# Patient Record
Sex: FEMALE | Race: WHITE | NOT HISPANIC OR LATINO | Employment: OTHER | URBAN - NONMETROPOLITAN AREA
[De-identification: names, ages, dates, MRNs, and addresses within clinical notes are randomized per-mention and may not be internally consistent; named-entity substitution may affect disease eponyms.]

---

## 2023-03-02 LAB — THYROTROPIN (MIU/L) IN SER/PLAS BY DETECTION LIMIT <= 0.05 MIU/L: 3.51 MIU/L (ref 0.44–3.98)

## 2023-04-03 PROBLEM — G62.9 NEUROPATHY: Status: ACTIVE | Noted: 2023-04-03

## 2023-04-15 DIAGNOSIS — E11.649 TYPE 2 DIABETES MELLITUS WITH HYPOGLYCEMIA WITHOUT COMA, WITHOUT LONG-TERM CURRENT USE OF INSULIN (MULTI): ICD-10-CM

## 2023-04-15 DIAGNOSIS — D52.9 ANEMIA DUE TO FOLIC ACID DEFICIENCY, UNSPECIFIED DEFICIENCY TYPE: ICD-10-CM

## 2023-04-15 DIAGNOSIS — E83.42 HYPOMAGNESEMIA: ICD-10-CM

## 2023-04-17 RX ORDER — FOLIC ACID 1 MG/1
1 TABLET ORAL DAILY
COMMUNITY
End: 2023-09-06 | Stop reason: SDUPTHER

## 2023-04-17 RX ORDER — FOLIC ACID 1 MG/1
TABLET ORAL
Qty: 90 TABLET | Refills: 0 | Status: SHIPPED | OUTPATIENT
Start: 2023-04-17 | End: 2023-07-17

## 2023-04-17 RX ORDER — LANOLIN ALCOHOL/MO/W.PET/CERES
CREAM (GRAM) TOPICAL
Qty: 90 TABLET | Refills: 0 | Status: SHIPPED | OUTPATIENT
Start: 2023-04-17 | End: 2023-07-24

## 2023-04-17 RX ORDER — LANOLIN ALCOHOL/MO/W.PET/CERES
1 CREAM (GRAM) TOPICAL DAILY
COMMUNITY
End: 2023-09-06 | Stop reason: SDUPTHER

## 2023-04-17 RX ORDER — METFORMIN HYDROCHLORIDE 500 MG/1
TABLET ORAL
Qty: 180 TABLET | Refills: 0 | Status: SHIPPED | OUTPATIENT
Start: 2023-04-17 | End: 2023-08-15 | Stop reason: SDUPTHER

## 2023-04-17 RX ORDER — METFORMIN HYDROCHLORIDE 500 MG/1
500 TABLET ORAL
COMMUNITY
End: 2023-09-06 | Stop reason: SDUPTHER

## 2023-04-18 PROBLEM — E11.649 TYPE 2 DIABETES MELLITUS WITH HYPOGLYCEMIA WITHOUT COMA, WITHOUT LONG-TERM CURRENT USE OF INSULIN (MULTI): Status: ACTIVE | Noted: 2023-04-18

## 2023-04-18 RX ORDER — BLOOD-GLUCOSE METER
1 EACH MISCELLANEOUS DAILY
Qty: 90 STRIP | Refills: 0 | Status: SHIPPED | OUTPATIENT
Start: 2023-04-18 | End: 2023-07-24 | Stop reason: SDUPTHER

## 2023-04-18 RX ORDER — BLOOD-GLUCOSE METER
1 EACH MISCELLANEOUS DAILY
COMMUNITY
End: 2023-04-18 | Stop reason: SDUPTHER

## 2023-05-22 PROBLEM — D64.9 ANEMIA: Status: ACTIVE | Noted: 2023-05-22

## 2023-05-31 DIAGNOSIS — G62.9 NEUROPATHY: ICD-10-CM

## 2023-05-31 RX ORDER — GABAPENTIN 100 MG/1
CAPSULE ORAL
Qty: 90 CAPSULE | Refills: 0 | Status: SHIPPED | OUTPATIENT
Start: 2023-05-31 | End: 2023-09-27

## 2023-07-16 DIAGNOSIS — D52.9 ANEMIA DUE TO FOLIC ACID DEFICIENCY, UNSPECIFIED DEFICIENCY TYPE: ICD-10-CM

## 2023-07-17 RX ORDER — FOLIC ACID 1 MG/1
TABLET ORAL
Qty: 90 TABLET | Refills: 0 | Status: SHIPPED | OUTPATIENT
Start: 2023-07-17 | End: 2023-10-30

## 2023-07-24 DIAGNOSIS — E83.42 HYPOMAGNESEMIA: ICD-10-CM

## 2023-07-24 DIAGNOSIS — E11.649 TYPE 2 DIABETES MELLITUS WITH HYPOGLYCEMIA WITHOUT COMA, WITHOUT LONG-TERM CURRENT USE OF INSULIN (MULTI): ICD-10-CM

## 2023-07-24 RX ORDER — LANOLIN ALCOHOL/MO/W.PET/CERES
CREAM (GRAM) TOPICAL
Qty: 90 TABLET | Refills: 0 | Status: SHIPPED | OUTPATIENT
Start: 2023-07-24 | End: 2023-11-06

## 2023-07-24 RX ORDER — BLOOD-GLUCOSE METER
1 EACH MISCELLANEOUS DAILY
Qty: 90 STRIP | Refills: 0 | Status: SHIPPED | OUTPATIENT
Start: 2023-07-24

## 2023-07-26 LAB
ALBUMIN (G/DL) IN SER/PLAS: 4.6 G/DL (ref 3.4–5)
ALBUMIN (MG/L) IN URINE: 13.8 MG/L
ALBUMIN/CREATININE (UG/MG) IN URINE: 11 UG/MG CRT (ref 0–30)
ANION GAP IN SER/PLAS: 12 MMOL/L (ref 10–20)
CALCIDIOL (25 OH VITAMIN D3) (NG/ML) IN SER/PLAS: 27 NG/ML
CALCIUM (MG/DL) IN SER/PLAS: 9.9 MG/DL (ref 8.6–10.3)
CARBON DIOXIDE, TOTAL (MMOL/L) IN SER/PLAS: 24 MMOL/L (ref 21–32)
CHLORIDE (MMOL/L) IN SER/PLAS: 105 MMOL/L (ref 98–107)
CREATININE (MG/DL) IN SER/PLAS: 1.25 MG/DL (ref 0.5–1.05)
CREATININE (MG/DL) IN URINE: 126 MG/DL (ref 20–320)
GFR FEMALE: 46 ML/MIN/1.73M2
GLUCOSE (MG/DL) IN SER/PLAS: 140 MG/DL (ref 74–99)
PARATHYRIN INTACT (PG/ML) IN SER/PLAS: 42.6 PG/ML (ref 18.5–88)
PHOSPHATE (MG/DL) IN SER/PLAS: 3.5 MG/DL (ref 2.5–4.9)
POTASSIUM (MMOL/L) IN SER/PLAS: 4.1 MMOL/L (ref 3.5–5.3)
SODIUM (MMOL/L) IN SER/PLAS: 137 MMOL/L (ref 136–145)
URATE (MG/DL) IN SER/PLAS: 4.4 MG/DL (ref 2.3–6.7)
UREA NITROGEN (MG/DL) IN SER/PLAS: 17 MG/DL (ref 6–23)

## 2023-08-11 DIAGNOSIS — E11.649 TYPE 2 DIABETES MELLITUS WITH HYPOGLYCEMIA WITHOUT COMA, WITHOUT LONG-TERM CURRENT USE OF INSULIN (MULTI): ICD-10-CM

## 2023-08-15 DIAGNOSIS — E11.649 TYPE 2 DIABETES MELLITUS WITH HYPOGLYCEMIA WITHOUT COMA, WITHOUT LONG-TERM CURRENT USE OF INSULIN (MULTI): ICD-10-CM

## 2023-08-15 RX ORDER — METFORMIN HYDROCHLORIDE 500 MG/1
TABLET ORAL
Qty: 180 TABLET | Refills: 0 | Status: SHIPPED | OUTPATIENT
Start: 2023-08-15 | End: 2023-09-06 | Stop reason: SDUPTHER

## 2023-08-16 RX ORDER — METFORMIN HYDROCHLORIDE 500 MG/1
TABLET ORAL
Qty: 180 TABLET | Refills: 0 | Status: SHIPPED | OUTPATIENT
Start: 2023-08-16 | End: 2024-03-25 | Stop reason: SDUPTHER

## 2023-09-05 ENCOUNTER — APPOINTMENT (OUTPATIENT)
Dept: PRIMARY CARE | Facility: CLINIC | Age: 73
End: 2023-09-05
Payer: MEDICARE

## 2023-09-06 DIAGNOSIS — D64.9 ANEMIA, UNSPECIFIED TYPE: ICD-10-CM

## 2023-09-06 PROBLEM — N18.32 STAGE 3B CHRONIC KIDNEY DISEASE (MULTI): Status: ACTIVE | Noted: 2023-09-06

## 2023-09-06 PROBLEM — E05.90 HYPERTHYROIDISM: Status: ACTIVE | Noted: 2023-09-06

## 2023-09-06 PROBLEM — F41.9 CHRONIC ANXIETY: Status: ACTIVE | Noted: 2023-09-06

## 2023-09-06 PROBLEM — K21.9 ESOPHAGEAL REFLUX: Status: ACTIVE | Noted: 2023-09-06

## 2023-09-06 PROBLEM — D50.9 IRON DEFICIENCY ANEMIA: Status: ACTIVE | Noted: 2023-09-06

## 2023-09-06 PROBLEM — R92.8 ABNORMAL MAMMOGRAM: Status: ACTIVE | Noted: 2023-09-06

## 2023-09-06 PROBLEM — D52.9 FOLATE-DEFICIENCY ANEMIA: Status: ACTIVE | Noted: 2023-09-06

## 2023-09-06 PROBLEM — E83.42 HYPOMAGNESEMIA: Status: ACTIVE | Noted: 2023-09-06

## 2023-09-06 PROBLEM — E04.2 NONTOXIC MULTINODULAR GOITER: Status: ACTIVE | Noted: 2023-09-06

## 2023-09-06 PROBLEM — E55.9 VITAMIN D DEFICIENCY: Status: ACTIVE | Noted: 2023-09-06

## 2023-09-06 PROBLEM — E78.5 HYPERLIPIDEMIA: Status: ACTIVE | Noted: 2023-09-06

## 2023-09-06 PROBLEM — I10 HTN (HYPERTENSION): Status: ACTIVE | Noted: 2023-09-06

## 2023-09-06 RX ORDER — CHOLECALCIFEROL (VITAMIN D3) 50 MCG
50 TABLET ORAL DAILY
COMMUNITY

## 2023-09-06 RX ORDER — ACETAMINOPHEN 325 MG/1
TABLET ORAL EVERY 6 HOURS
COMMUNITY
Start: 2021-12-30

## 2023-09-06 RX ORDER — FERROUS SULFATE 325(65) MG
1 TABLET, DELAYED RELEASE (ENTERIC COATED) ORAL DAILY
Qty: 90 TABLET | Refills: 1 | Status: SHIPPED | OUTPATIENT
Start: 2023-09-06 | End: 2023-09-14 | Stop reason: SDUPTHER

## 2023-09-06 RX ORDER — LOSARTAN POTASSIUM 25 MG/1
25 TABLET ORAL DAILY
COMMUNITY
Start: 2023-08-22 | End: 2024-04-17

## 2023-09-06 RX ORDER — FEBUXOSTAT 40 MG/1
40 TABLET, FILM COATED ORAL DAILY
COMMUNITY
Start: 2023-08-08 | End: 2024-05-14

## 2023-09-06 RX ORDER — DAPAGLIFLOZIN 5 MG/1
1 TABLET, FILM COATED ORAL
COMMUNITY
Start: 2022-01-13

## 2023-09-07 ENCOUNTER — APPOINTMENT (OUTPATIENT)
Dept: PRIMARY CARE | Facility: CLINIC | Age: 73
End: 2023-09-07
Payer: MEDICARE

## 2023-09-14 ENCOUNTER — LAB (OUTPATIENT)
Dept: LAB | Facility: LAB | Age: 73
End: 2023-09-14
Payer: MEDICARE

## 2023-09-14 ENCOUNTER — OFFICE VISIT (OUTPATIENT)
Dept: PRIMARY CARE | Facility: CLINIC | Age: 73
End: 2023-09-14
Payer: MEDICARE

## 2023-09-14 VITALS
BODY MASS INDEX: 31 KG/M2 | WEIGHT: 181.6 LBS | HEART RATE: 98 BPM | DIASTOLIC BLOOD PRESSURE: 68 MMHG | OXYGEN SATURATION: 99 % | TEMPERATURE: 96.8 F | HEIGHT: 64 IN | SYSTOLIC BLOOD PRESSURE: 110 MMHG

## 2023-09-14 DIAGNOSIS — R53.83 OTHER FATIGUE: ICD-10-CM

## 2023-09-14 DIAGNOSIS — Z23 FLU VACCINE NEED: ICD-10-CM

## 2023-09-14 DIAGNOSIS — N18.32 STAGE 3B CHRONIC KIDNEY DISEASE (MULTI): ICD-10-CM

## 2023-09-14 DIAGNOSIS — Z11.59 NEED FOR HEPATITIS C SCREENING TEST: ICD-10-CM

## 2023-09-14 DIAGNOSIS — E55.9 VITAMIN D DEFICIENCY, UNSPECIFIED: ICD-10-CM

## 2023-09-14 DIAGNOSIS — E11.649 TYPE 2 DIABETES MELLITUS WITH HYPOGLYCEMIA WITHOUT COMA, WITHOUT LONG-TERM CURRENT USE OF INSULIN (MULTI): ICD-10-CM

## 2023-09-14 DIAGNOSIS — E83.42 HYPOMAGNESEMIA: ICD-10-CM

## 2023-09-14 DIAGNOSIS — Z13.89 SCREENING FOR MULTIPLE CONDITIONS: ICD-10-CM

## 2023-09-14 DIAGNOSIS — E78.00 HYPERCHOLESTEREMIA: ICD-10-CM

## 2023-09-14 DIAGNOSIS — E78.5 HYPERLIPIDEMIA, UNSPECIFIED HYPERLIPIDEMIA TYPE: ICD-10-CM

## 2023-09-14 DIAGNOSIS — M19.90 ARTHRITIS: ICD-10-CM

## 2023-09-14 DIAGNOSIS — E05.90 HYPERTHYROIDISM: ICD-10-CM

## 2023-09-14 DIAGNOSIS — D50.9 IRON DEFICIENCY ANEMIA, UNSPECIFIED IRON DEFICIENCY ANEMIA TYPE: ICD-10-CM

## 2023-09-14 DIAGNOSIS — E53.8 VITAMIN B12 DEFICIENCY: ICD-10-CM

## 2023-09-14 DIAGNOSIS — Z78.0 ASYMPTOMATIC MENOPAUSAL STATE: ICD-10-CM

## 2023-09-14 DIAGNOSIS — Z79.899 HIGH RISK MEDICATION USE: ICD-10-CM

## 2023-09-14 DIAGNOSIS — I10 HYPERTENSION, UNSPECIFIED TYPE: ICD-10-CM

## 2023-09-14 DIAGNOSIS — Z00.00 ROUTINE GENERAL MEDICAL EXAMINATION AT HEALTH CARE FACILITY: Primary | ICD-10-CM

## 2023-09-14 DIAGNOSIS — Z12.31 ENCOUNTER FOR SCREENING MAMMOGRAM FOR BREAST CANCER: ICD-10-CM

## 2023-09-14 LAB
ALANINE AMINOTRANSFERASE (SGPT) (U/L) IN SER/PLAS: 12 U/L (ref 7–45)
ALBUMIN (G/DL) IN SER/PLAS: 4.3 G/DL (ref 3.4–5)
ALKALINE PHOSPHATASE (U/L) IN SER/PLAS: 47 U/L (ref 33–136)
ANION GAP IN SER/PLAS: 16 MMOL/L (ref 10–20)
ASPARTATE AMINOTRANSFERASE (SGOT) (U/L) IN SER/PLAS: 15 U/L (ref 9–39)
BASOPHILS (10*3/UL) IN BLOOD BY AUTOMATED COUNT: 0.04 X10E9/L (ref 0–0.1)
BASOPHILS/100 LEUKOCYTES IN BLOOD BY AUTOMATED COUNT: 0.6 % (ref 0–2)
BILIRUBIN TOTAL (MG/DL) IN SER/PLAS: 0.4 MG/DL (ref 0–1.2)
CALCIUM (MG/DL) IN SER/PLAS: 9.6 MG/DL (ref 8.6–10.3)
CARBON DIOXIDE, TOTAL (MMOL/L) IN SER/PLAS: 23 MMOL/L (ref 21–32)
CHLORIDE (MMOL/L) IN SER/PLAS: 103 MMOL/L (ref 98–107)
CHOLESTEROL (MG/DL) IN SER/PLAS: 165 MG/DL (ref 0–199)
CHOLESTEROL IN HDL (MG/DL) IN SER/PLAS: 31 MG/DL
CHOLESTEROL/HDL RATIO: 5.3
CREATININE (MG/DL) IN SER/PLAS: 1.15 MG/DL (ref 0.5–1.05)
EOSINOPHILS (10*3/UL) IN BLOOD BY AUTOMATED COUNT: 0.12 X10E9/L (ref 0–0.4)
EOSINOPHILS/100 LEUKOCYTES IN BLOOD BY AUTOMATED COUNT: 1.7 % (ref 0–6)
ERYTHROCYTE DISTRIBUTION WIDTH (RATIO) BY AUTOMATED COUNT: 12.7 % (ref 11.5–14.5)
ERYTHROCYTE MEAN CORPUSCULAR HEMOGLOBIN CONCENTRATION (G/DL) BY AUTOMATED: 31.1 G/DL (ref 32–36)
ERYTHROCYTE MEAN CORPUSCULAR VOLUME (FL) BY AUTOMATED COUNT: 97 FL (ref 80–100)
ERYTHROCYTES (10*6/UL) IN BLOOD BY AUTOMATED COUNT: 4.23 X10E12/L (ref 4–5.2)
GFR FEMALE: 51 ML/MIN/1.73M2
GLUCOSE (MG/DL) IN SER/PLAS: 124 MG/DL (ref 74–99)
HEMATOCRIT (%) IN BLOOD BY AUTOMATED COUNT: 41.1 % (ref 36–46)
HEMOGLOBIN (G/DL) IN BLOOD: 12.8 G/DL (ref 12–16)
IMMATURE GRANULOCYTES/100 LEUKOCYTES IN BLOOD BY AUTOMATED COUNT: 0.1 % (ref 0–0.9)
LDL: 72 MG/DL (ref 0–99)
LEUKOCYTES (10*3/UL) IN BLOOD BY AUTOMATED COUNT: 6.9 X10E9/L (ref 4.4–11.3)
LYMPHOCYTES (10*3/UL) IN BLOOD BY AUTOMATED COUNT: 1.92 X10E9/L (ref 0.8–3)
LYMPHOCYTES/100 LEUKOCYTES IN BLOOD BY AUTOMATED COUNT: 27.8 % (ref 13–44)
MONOCYTES (10*3/UL) IN BLOOD BY AUTOMATED COUNT: 0.57 X10E9/L (ref 0.05–0.8)
MONOCYTES/100 LEUKOCYTES IN BLOOD BY AUTOMATED COUNT: 8.2 % (ref 2–10)
NEUTROPHILS (10*3/UL) IN BLOOD BY AUTOMATED COUNT: 4.25 X10E9/L (ref 1.6–5.5)
NEUTROPHILS/100 LEUKOCYTES IN BLOOD BY AUTOMATED COUNT: 61.6 % (ref 40–80)
NON HDL CHOLESTEROL: 134 MG/DL
PLATELETS (10*3/UL) IN BLOOD AUTOMATED COUNT: 338 X10E9/L (ref 150–450)
POC FINGERSTICK BLOOD GLUCOSE: 140 MG/DL (ref 70–100)
POC HEMOGLOBIN A1C: 6.4 % (ref 4.2–6.5)
POTASSIUM (MMOL/L) IN SER/PLAS: 4.7 MMOL/L (ref 3.5–5.3)
PROTEIN TOTAL: 7.5 G/DL (ref 6.4–8.2)
SODIUM (MMOL/L) IN SER/PLAS: 137 MMOL/L (ref 136–145)
THYROTROPIN (MIU/L) IN SER/PLAS BY DETECTION LIMIT <= 0.05 MIU/L: 1.66 MIU/L (ref 0.44–3.98)
TRIGLYCERIDE (MG/DL) IN SER/PLAS: 309 MG/DL (ref 0–149)
UREA NITROGEN (MG/DL) IN SER/PLAS: 21 MG/DL (ref 6–23)
VLDL: 62 MG/DL (ref 0–40)

## 2023-09-14 PROCEDURE — 84443 ASSAY THYROID STIM HORMONE: CPT

## 2023-09-14 PROCEDURE — 82962 GLUCOSE BLOOD TEST: CPT | Performed by: INTERNAL MEDICINE

## 2023-09-14 PROCEDURE — 90662 IIV NO PRSV INCREASED AG IM: CPT | Performed by: INTERNAL MEDICINE

## 2023-09-14 PROCEDURE — G0439 PPPS, SUBSEQ VISIT: HCPCS | Performed by: INTERNAL MEDICINE

## 2023-09-14 PROCEDURE — 36415 COLL VENOUS BLD VENIPUNCTURE: CPT

## 2023-09-14 PROCEDURE — 85025 COMPLETE CBC W/AUTO DIFF WBC: CPT

## 2023-09-14 PROCEDURE — 80061 LIPID PANEL: CPT

## 2023-09-14 PROCEDURE — G0444 DEPRESSION SCREEN ANNUAL: HCPCS | Performed by: INTERNAL MEDICINE

## 2023-09-14 PROCEDURE — 3078F DIAST BP <80 MM HG: CPT | Performed by: INTERNAL MEDICINE

## 2023-09-14 PROCEDURE — 83036 HEMOGLOBIN GLYCOSYLATED A1C: CPT | Performed by: INTERNAL MEDICINE

## 2023-09-14 PROCEDURE — 3074F SYST BP LT 130 MM HG: CPT | Performed by: INTERNAL MEDICINE

## 2023-09-14 PROCEDURE — 1036F TOBACCO NON-USER: CPT | Performed by: INTERNAL MEDICINE

## 2023-09-14 PROCEDURE — 80053 COMPREHEN METABOLIC PANEL: CPT

## 2023-09-14 PROCEDURE — 86803 HEPATITIS C AB TEST: CPT

## 2023-09-14 PROCEDURE — 1170F FXNL STATUS ASSESSED: CPT | Performed by: INTERNAL MEDICINE

## 2023-09-14 PROCEDURE — 82607 VITAMIN B-12: CPT

## 2023-09-14 PROCEDURE — 1159F MED LIST DOCD IN RCRD: CPT | Performed by: INTERNAL MEDICINE

## 2023-09-14 PROCEDURE — 4010F ACE/ARB THERAPY RXD/TAKEN: CPT | Performed by: INTERNAL MEDICINE

## 2023-09-14 PROCEDURE — 82306 VITAMIN D 25 HYDROXY: CPT

## 2023-09-14 PROCEDURE — 99214 OFFICE O/P EST MOD 30 MIN: CPT | Performed by: INTERNAL MEDICINE

## 2023-09-14 PROCEDURE — G0008 ADMIN INFLUENZA VIRUS VAC: HCPCS | Performed by: INTERNAL MEDICINE

## 2023-09-14 PROCEDURE — 1160F RVW MEDS BY RX/DR IN RCRD: CPT | Performed by: INTERNAL MEDICINE

## 2023-09-14 ASSESSMENT — ENCOUNTER SYMPTOMS
UNEXPECTED WEIGHT CHANGE: 0
SINUS PAIN: 0
PALPITATIONS: 0
SORE THROAT: 0
LOSS OF SENSATION IN FEET: 0
HEADACHES: 0
DIZZINESS: 0
WEAKNESS: 1
HYPERTENSION: 1
WHEEZING: 0
BRUISES/BLEEDS EASILY: 0
DIARRHEA: 0
ARTHRALGIAS: 0
DEPRESSION: 0
BLOOD IN STOOL: 0
ABDOMINAL PAIN: 0
FATIGUE: 0
FEVER: 0
DIFFICULTY URINATING: 0
OCCASIONAL FEELINGS OF UNSTEADINESS: 1
COUGH: 0

## 2023-09-14 ASSESSMENT — ACTIVITIES OF DAILY LIVING (ADL)
TAKING_MEDICATION: INDEPENDENT
DRESSING: INDEPENDENT
DOING_HOUSEWORK: INDEPENDENT
BATHING: INDEPENDENT
MANAGING_FINANCES: INDEPENDENT
GROCERY_SHOPPING: INDEPENDENT

## 2023-09-14 ASSESSMENT — PATIENT HEALTH QUESTIONNAIRE - PHQ9
1. LITTLE INTEREST OR PLEASURE IN DOING THINGS: NOT AT ALL
2. FEELING DOWN, DEPRESSED OR HOPELESS: NOT AT ALL
SUM OF ALL RESPONSES TO PHQ9 QUESTIONS 1 AND 2: 0

## 2023-09-14 NOTE — PROGRESS NOTES
Subjective   Reason for Visit: Shakira Lloyd is an 73 y.o. female here for a Medicare Wellness visit.     Past Medical, Surgical, and Family History reviewed and updated in chart.    Reviewed all medications by prescribing practitioner or clinical pharmacist (such as prescriptions, OTCs, herbal therapies and supplements) and documented in the medical record.    Annual preventive visit  - Vaccinations up-to-date needs flu vaccine today given  Need to proceed with RCV and the COVID-vaccine  -Screening mammogram needed today order placed  - Needs bone densitometry order placed  - Schedule colonoscopy not needed at this time  - Need to proceed with complete blood work  - Screening for depression  I spent 15 minutes obtaining and discussing depression screening using PHQ-2 questions with results documented in the chart.    Medical screening reviewed with patient    Follow-up  - Diabetes doing very well random sugar 140 hemoglobin A1c 6.4 continue with diet control low-fat diet  - Chronic no flareups, patient seen by nephrology diagnosed with hyperuricemia patient now started on Uloric 40 mg daily  -Renal insufficiency improving continue on Farxiga follow-up nephrology continue low-salt diet  -Anemia controlled improved continues iron supplement follow-up CBC  - Hypertension controlled  - Peripheral neuropathy follow-up magnesium level vitamin D vitamin B12 continues multiple vitamins  Continue gabapentin  - Arthritis continues Tylenol as needed patient given disability placard for 5 years  -History of hypothyroid toxic multinodular goiter treated by endocrinology patient off any treatment methimazole for almost a year she needs to follow-up thyroid function test order placed today  -Poor mobility peripheral neuropathy given disability placard today  Follow-up 6 months         Hypertension  Pertinent negatives include no chest pain, headaches or palpitations.   Hyperlipidemia  Pertinent negatives include no chest pain.  "  Diabetes  Pertinent negatives for hypoglycemia include no dizziness or headaches. Associated symptoms include weakness. Pertinent negatives for diabetes include no chest pain and no fatigue.       Patient Care Team:  Seth Robles MD as PCP - General  Seth Robles MD as PCP - [facility] Attributed Provider     Review of Systems   Constitutional:  Negative for fatigue, fever and unexpected weight change.   HENT:  Negative for congestion, ear discharge, ear pain, mouth sores, sinus pain and sore throat.    Eyes:  Negative for visual disturbance.   Respiratory:  Negative for cough and wheezing.    Cardiovascular:  Negative for chest pain, palpitations and leg swelling.   Gastrointestinal:  Negative for abdominal pain, blood in stool and diarrhea.   Genitourinary:  Negative for difficulty urinating.   Musculoskeletal:  Negative for arthralgias.   Skin:  Negative for rash.   Neurological:  Positive for weakness. Negative for dizziness and headaches.   Hematological:  Does not bruise/bleed easily.   Psychiatric/Behavioral:  Negative for behavioral problems.    All other systems reviewed and are negative.      Objective   Vitals:  /68   Pulse 98   Temp 36 °C (96.8 °F)   Ht 1.626 m (5' 4\")   Wt 82.4 kg (181 lb 9.6 oz)   SpO2 99%   BMI 31.17 kg/m²     Lab Results   Component Value Date    WBC 8.5 01/16/2023    HGB 12.8 01/16/2023    HCT 42.3 01/16/2023     01/16/2023    CHOL 183 02/01/2023    TRIG 366 (H) 02/01/2023    HDL 37.0 (A) 02/01/2023    ALT 16 01/11/2022    AST 16 01/11/2022     07/26/2023    K 4.1 07/26/2023     07/26/2023    CREATININE 1.25 (H) 07/26/2023    BUN 17 07/26/2023    CO2 24 07/26/2023    TSH 3.51 03/02/2023    INR 1.1 11/22/2021    HGBA1C 6.4 09/14/2023     par   Physical Exam  Vitals and nursing note reviewed.   Constitutional:       Appearance: Normal appearance.   HENT:      Head: Normocephalic.      Nose: Nose normal.   Eyes:      Conjunctiva/sclera: " Conjunctivae normal.      Pupils: Pupils are equal, round, and reactive to light.   Cardiovascular:      Rate and Rhythm: Regular rhythm.   Pulmonary:      Effort: Pulmonary effort is normal.      Breath sounds: Normal breath sounds.   Abdominal:      General: Abdomen is flat.      Palpations: Abdomen is soft.   Musculoskeletal:      Cervical back: Neck supple.      Right lower leg: Edema present.      Left lower leg: Edema present.   Skin:     General: Skin is warm.   Neurological:      General: No focal deficit present.      Mental Status: She is oriented to person, place, and time.   Psychiatric:         Mood and Affect: Mood normal.         Assessment/Plan   Problem List Items Addressed This Visit       Type 2 diabetes mellitus with hypoglycemia without coma, without long-term current use of insulin (CMS/[facility])    Relevant Orders    POCT fingerstick glucose manually resulted (Completed)    POCT glycosylated hemoglobin (Hb A1C) manually resulted (Completed)    Anemia    Stage 3b chronic kidney disease (CMS/[facility])    HTN (hypertension)    Relevant Orders    Comprehensive Metabolic Panel    Hyperlipidemia    Hyperthyroidism    Hypomagnesemia     Other Visit Diagnoses       Routine general medical examination at health care facility    -  Primary    Flu vaccine need        Relevant Orders    Flu vaccine, quadrivalent, high-dose, preservative free, age 65y+ (FLUZONE) (Completed)    Screening for multiple conditions        Asymptomatic menopausal state        Relevant Orders    XR DEXA bone density    Encounter for screening mammogram for breast cancer        Relevant Orders    BI mammo bilateral screening tomosynthesis    Need for hepatitis C screening test        Relevant Orders    Hepatitis C antibody    High risk medication use        Relevant Orders    CBC and Auto Differential    Hypercholesteremia        Relevant Orders    Lipid Panel    Other fatigue        Relevant Orders    TSH with reflex to Free T4 if abnormal     Vitamin B12 deficiency        Relevant Orders    Vitamin B12    Vitamin D deficiency, unspecified        Relevant Orders    Vitamin D 25-Hydroxy,Total (for eval of Vitamin D levels)    Arthritis        Relevant Orders    Disability Placard        Annual preventive visit  - Vaccinations up-to-date needs flu vaccine today given  Need to proceed with RCV and the COVID-vaccine  -Screening mammogram needed today order placed  - Needs bone densitometry order placed  - Schedule colonoscopy not needed at this time  - Need to proceed with complete blood work  - Screening for depression  I spent 15 minutes obtaining and discussing depression screening using PHQ-2 questions with results documented in the chart.    Medical screening reviewed with patient    Follow-up  - Diabetes doing very well random sugar 140 hemoglobin A1c 6.4 continue with diet control low-fat diet  - Chronic no flareups, patient seen by nephrology diagnosed with hyperuricemia patient now started on Uloric 40 mg daily  -Renal insufficiency improving continue on Farxiga follow-up nephrology continue low-salt diet  -Anemia controlled improved continues iron supplement follow-up CBC  - Hypertension controlled  - Peripheral neuropathy follow-up magnesium level vitamin D vitamin B12 continues multiple vitamins  Continue gabapentin  - Arthritis continues Tylenol as needed patient given disability placard for 5 years  -History of hypothyroid toxic multinodular goiter treated by endocrinology patient off any treatment methimazole for almost a year she needs to follow-up thyroid function test order placed today  -Poor mobility peripheral neuropathy given disability placard today  Follow-up 6 months

## 2023-09-15 LAB
CALCIDIOL (25 OH VITAMIN D3) (NG/ML) IN SER/PLAS: 31 NG/ML
COBALAMIN (VITAMIN B12) (PG/ML) IN SER/PLAS: 334 PG/ML (ref 211–911)
HEPATITIS C VIRUS AB PRESENCE IN SERUM: NONREACTIVE

## 2023-10-28 DIAGNOSIS — D52.9 ANEMIA DUE TO FOLIC ACID DEFICIENCY, UNSPECIFIED DEFICIENCY TYPE: ICD-10-CM

## 2023-10-30 RX ORDER — FOLIC ACID 1 MG/1
TABLET ORAL
Qty: 90 TABLET | Refills: 0 | Status: SHIPPED | OUTPATIENT
Start: 2023-10-30 | End: 2024-02-06

## 2023-11-04 DIAGNOSIS — E83.42 HYPOMAGNESEMIA: ICD-10-CM

## 2023-11-06 RX ORDER — LANOLIN ALCOHOL/MO/W.PET/CERES
1 CREAM (GRAM) TOPICAL DAILY
Qty: 90 TABLET | Refills: 0 | Status: SHIPPED | OUTPATIENT
Start: 2023-11-06 | End: 2024-02-19

## 2023-11-21 DIAGNOSIS — G62.9 NEUROPATHY: ICD-10-CM

## 2023-11-21 RX ORDER — GABAPENTIN 100 MG/1
CAPSULE ORAL
Qty: 90 CAPSULE | Refills: 0 | Status: SHIPPED | OUTPATIENT
Start: 2023-11-21 | End: 2024-01-03 | Stop reason: SDUPTHER

## 2023-12-03 DIAGNOSIS — I10 HYPERTENSION, UNSPECIFIED TYPE: ICD-10-CM

## 2023-12-04 RX ORDER — METOPROLOL SUCCINATE 50 MG/1
50 TABLET, EXTENDED RELEASE ORAL DAILY
Qty: 90 TABLET | Refills: 0 | Status: SHIPPED | OUTPATIENT
Start: 2023-12-04 | End: 2023-12-08 | Stop reason: SDUPTHER

## 2023-12-06 DIAGNOSIS — I10 HYPERTENSION, UNSPECIFIED TYPE: ICD-10-CM

## 2023-12-08 RX ORDER — METOPROLOL SUCCINATE 50 MG/1
50 TABLET, EXTENDED RELEASE ORAL DAILY
Qty: 90 TABLET | Refills: 0 | Status: SHIPPED | OUTPATIENT
Start: 2023-12-08 | End: 2024-03-18

## 2024-01-03 DIAGNOSIS — G62.9 NEUROPATHY: ICD-10-CM

## 2024-01-03 RX ORDER — GABAPENTIN 100 MG/1
CAPSULE ORAL
Qty: 90 CAPSULE | Refills: 0 | Status: SHIPPED | OUTPATIENT
Start: 2024-01-03 | End: 2024-03-25 | Stop reason: SDUPTHER

## 2024-02-04 DIAGNOSIS — D52.9 ANEMIA DUE TO FOLIC ACID DEFICIENCY, UNSPECIFIED DEFICIENCY TYPE: ICD-10-CM

## 2024-02-06 RX ORDER — FOLIC ACID 1 MG/1
TABLET ORAL
Qty: 90 TABLET | Refills: 0 | Status: SHIPPED | OUTPATIENT
Start: 2024-02-06 | End: 2024-03-25 | Stop reason: SDUPTHER

## 2024-02-18 DIAGNOSIS — E83.42 HYPOMAGNESEMIA: ICD-10-CM

## 2024-02-19 RX ORDER — LANOLIN ALCOHOL/MO/W.PET/CERES
1 CREAM (GRAM) TOPICAL DAILY
Qty: 90 TABLET | Refills: 0 | Status: SHIPPED | OUTPATIENT
Start: 2024-02-19 | End: 2024-03-25 | Stop reason: SDUPTHER

## 2024-02-26 ENCOUNTER — LAB (OUTPATIENT)
Dept: LAB | Facility: LAB | Age: 74
End: 2024-02-26
Payer: MEDICARE

## 2024-02-26 DIAGNOSIS — E55.9 VITAMIN D DEFICIENCY, UNSPECIFIED: ICD-10-CM

## 2024-02-26 DIAGNOSIS — D52.9 FOLATE DEFICIENCY ANEMIA, UNSPECIFIED: ICD-10-CM

## 2024-02-26 DIAGNOSIS — N18.32 CHRONIC KIDNEY DISEASE, STAGE 3B (MULTI): ICD-10-CM

## 2024-02-26 DIAGNOSIS — K21.9 GASTRO-ESOPHAGEAL REFLUX DISEASE WITHOUT ESOPHAGITIS: Primary | ICD-10-CM

## 2024-02-26 DIAGNOSIS — I10 ESSENTIAL (PRIMARY) HYPERTENSION: ICD-10-CM

## 2024-02-26 DIAGNOSIS — D64.9 ANEMIA, UNSPECIFIED: ICD-10-CM

## 2024-02-26 LAB
25(OH)D3 SERPL-MCNC: 46 NG/ML (ref 30–100)
ALBUMIN SERPL BCP-MCNC: 4.2 G/DL (ref 3.4–5)
ANION GAP SERPL CALC-SCNC: 15 MMOL/L (ref 10–20)
BASOPHILS # BLD AUTO: 0.04 X10*3/UL (ref 0–0.1)
BASOPHILS NFR BLD AUTO: 0.4 %
BUN SERPL-MCNC: 34 MG/DL (ref 6–23)
CALCIUM SERPL-MCNC: 10.4 MG/DL (ref 8.6–10.3)
CHLORIDE SERPL-SCNC: 104 MMOL/L (ref 98–107)
CO2 SERPL-SCNC: 24 MMOL/L (ref 21–32)
CREAT SERPL-MCNC: 1.2 MG/DL (ref 0.5–1.05)
CREAT UR-MCNC: 73.3 MG/DL (ref 20–320)
EGFRCR SERPLBLD CKD-EPI 2021: 48 ML/MIN/1.73M*2
EOSINOPHIL # BLD AUTO: 0.27 X10*3/UL (ref 0–0.4)
EOSINOPHIL NFR BLD AUTO: 2.9 %
ERYTHROCYTE [DISTWIDTH] IN BLOOD BY AUTOMATED COUNT: 12.6 % (ref 11.5–14.5)
FERRITIN SERPL-MCNC: 727 NG/ML (ref 8–150)
GLUCOSE SERPL-MCNC: 129 MG/DL (ref 74–99)
HCT VFR BLD AUTO: 39.7 % (ref 36–46)
HGB BLD-MCNC: 12.6 G/DL (ref 12–16)
IMM GRANULOCYTES # BLD AUTO: 0.04 X10*3/UL (ref 0–0.5)
IMM GRANULOCYTES NFR BLD AUTO: 0.4 % (ref 0–0.9)
IRON SATN MFR SERPL: 29 % (ref 25–45)
IRON SERPL-MCNC: 89 UG/DL (ref 35–150)
LYMPHOCYTES # BLD AUTO: 3.81 X10*3/UL (ref 0.8–3)
LYMPHOCYTES NFR BLD AUTO: 41.5 %
MCH RBC QN AUTO: 30.5 PG (ref 26–34)
MCHC RBC AUTO-ENTMCNC: 31.7 G/DL (ref 32–36)
MCV RBC AUTO: 96 FL (ref 80–100)
MICROALBUMIN UR-MCNC: <7 MG/L
MICROALBUMIN/CREAT UR: NORMAL MG/G{CREAT}
MONOCYTES # BLD AUTO: 0.83 X10*3/UL (ref 0.05–0.8)
MONOCYTES NFR BLD AUTO: 9.1 %
NEUTROPHILS # BLD AUTO: 4.18 X10*3/UL (ref 1.6–5.5)
NEUTROPHILS NFR BLD AUTO: 45.7 %
NRBC BLD-RTO: 0 /100 WBCS (ref 0–0)
PHOSPHATE SERPL-MCNC: 3.7 MG/DL (ref 2.5–4.9)
PLATELET # BLD AUTO: 290 X10*3/UL (ref 150–450)
POTASSIUM SERPL-SCNC: 4.8 MMOL/L (ref 3.5–5.3)
PTH-INTACT SERPL-MCNC: 22.2 PG/ML (ref 18.5–88)
RBC # BLD AUTO: 4.13 X10*6/UL (ref 4–5.2)
SODIUM SERPL-SCNC: 138 MMOL/L (ref 136–145)
TIBC SERPL-MCNC: 304 UG/DL (ref 240–445)
UIBC SERPL-MCNC: 215 UG/DL (ref 110–370)
URATE SERPL-MCNC: 5.5 MG/DL (ref 2.3–6.7)
WBC # BLD AUTO: 9.2 X10*3/UL (ref 4.4–11.3)

## 2024-02-26 PROCEDURE — 82306 VITAMIN D 25 HYDROXY: CPT

## 2024-02-26 PROCEDURE — 82570 ASSAY OF URINE CREATININE: CPT

## 2024-02-26 PROCEDURE — 36415 COLL VENOUS BLD VENIPUNCTURE: CPT

## 2024-02-26 PROCEDURE — 83970 ASSAY OF PARATHORMONE: CPT

## 2024-02-26 PROCEDURE — 82043 UR ALBUMIN QUANTITATIVE: CPT

## 2024-02-26 NOTE — ADDENDUM NOTE
Addended by: NIRANJAN BANUELOS on: 2/26/2024 08:53 AM     Modules accepted: Orders    
Detail Level: Zone

## 2024-03-04 ENCOUNTER — APPOINTMENT (OUTPATIENT)
Dept: NEPHROLOGY | Facility: CLINIC | Age: 74
End: 2024-03-04
Payer: MEDICARE

## 2024-03-05 ENCOUNTER — HOSPITAL ENCOUNTER (OUTPATIENT)
Dept: RADIOLOGY | Facility: HOSPITAL | Age: 74
Discharge: HOME | End: 2024-03-05
Payer: MEDICARE

## 2024-03-05 DIAGNOSIS — Z12.31 ENCOUNTER FOR SCREENING MAMMOGRAM FOR BREAST CANCER: ICD-10-CM

## 2024-03-05 PROCEDURE — 77067 SCR MAMMO BI INCL CAD: CPT

## 2024-03-05 PROCEDURE — 77063 BREAST TOMOSYNTHESIS BI: CPT | Mod: BILATERAL PROCEDURE | Performed by: STUDENT IN AN ORGANIZED HEALTH CARE EDUCATION/TRAINING PROGRAM

## 2024-03-05 PROCEDURE — 77067 SCR MAMMO BI INCL CAD: CPT | Mod: BILATERAL PROCEDURE | Performed by: STUDENT IN AN ORGANIZED HEALTH CARE EDUCATION/TRAINING PROGRAM

## 2024-03-14 ENCOUNTER — APPOINTMENT (OUTPATIENT)
Dept: PRIMARY CARE | Facility: CLINIC | Age: 74
End: 2024-03-14
Payer: MEDICARE

## 2024-03-15 DIAGNOSIS — D50.9 IRON DEFICIENCY ANEMIA, UNSPECIFIED IRON DEFICIENCY ANEMIA TYPE: ICD-10-CM

## 2024-03-17 DIAGNOSIS — I10 HYPERTENSION, UNSPECIFIED TYPE: ICD-10-CM

## 2024-03-18 RX ORDER — FERROUS SULFATE 325(65) MG
1 TABLET, DELAYED RELEASE (ENTERIC COATED) ORAL DAILY
Qty: 90 TABLET | Refills: 0 | Status: SHIPPED | OUTPATIENT
Start: 2024-03-18

## 2024-03-18 RX ORDER — METOPROLOL SUCCINATE 50 MG/1
50 TABLET, EXTENDED RELEASE ORAL DAILY
Qty: 90 TABLET | Refills: 0 | Status: SHIPPED | OUTPATIENT
Start: 2024-03-18

## 2024-03-20 ENCOUNTER — APPOINTMENT (OUTPATIENT)
Dept: RADIOLOGY | Facility: HOSPITAL | Age: 74
End: 2024-03-20
Payer: MEDICARE

## 2024-03-20 ENCOUNTER — HOSPITAL ENCOUNTER (OUTPATIENT)
Dept: RADIOLOGY | Facility: HOSPITAL | Age: 74
Discharge: HOME | End: 2024-03-20
Payer: MEDICARE

## 2024-03-20 DIAGNOSIS — R92.8 ABNORMAL MAMMOGRAM: ICD-10-CM

## 2024-03-20 PROCEDURE — G0279 TOMOSYNTHESIS, MAMMO: HCPCS | Performed by: RADIOLOGY

## 2024-03-20 PROCEDURE — 77062 BREAST TOMOSYNTHESIS BI: CPT

## 2024-03-20 PROCEDURE — 77066 DX MAMMO INCL CAD BI: CPT | Performed by: RADIOLOGY

## 2024-03-25 ENCOUNTER — OFFICE VISIT (OUTPATIENT)
Dept: PRIMARY CARE | Facility: CLINIC | Age: 74
End: 2024-03-25
Payer: MEDICARE

## 2024-03-25 VITALS
SYSTOLIC BLOOD PRESSURE: 146 MMHG | HEART RATE: 90 BPM | WEIGHT: 183.8 LBS | BODY MASS INDEX: 31.55 KG/M2 | DIASTOLIC BLOOD PRESSURE: 80 MMHG | OXYGEN SATURATION: 97 % | TEMPERATURE: 96.9 F

## 2024-03-25 DIAGNOSIS — R53.83 OTHER FATIGUE: ICD-10-CM

## 2024-03-25 DIAGNOSIS — G62.9 NEUROPATHY: ICD-10-CM

## 2024-03-25 DIAGNOSIS — D52.9 ANEMIA DUE TO FOLIC ACID DEFICIENCY, UNSPECIFIED DEFICIENCY TYPE: ICD-10-CM

## 2024-03-25 DIAGNOSIS — E83.42 HYPOMAGNESEMIA: ICD-10-CM

## 2024-03-25 DIAGNOSIS — E78.00 HYPERCHOLESTEREMIA: ICD-10-CM

## 2024-03-25 DIAGNOSIS — N18.32 STAGE 3B CHRONIC KIDNEY DISEASE (MULTI): ICD-10-CM

## 2024-03-25 DIAGNOSIS — E11.649 TYPE 2 DIABETES MELLITUS WITH HYPOGLYCEMIA WITHOUT COMA, WITHOUT LONG-TERM CURRENT USE OF INSULIN (MULTI): Primary | ICD-10-CM

## 2024-03-25 DIAGNOSIS — I10 HYPERTENSION, UNSPECIFIED TYPE: ICD-10-CM

## 2024-03-25 DIAGNOSIS — Z79.899 HIGH RISK MEDICATION USE: ICD-10-CM

## 2024-03-25 DIAGNOSIS — Z79.4 TYPE 2 DIABETES MELLITUS WITH DIABETIC NEUROPATHY, WITH LONG-TERM CURRENT USE OF INSULIN (MULTI): ICD-10-CM

## 2024-03-25 DIAGNOSIS — E53.8 VITAMIN B12 DEFICIENCY: ICD-10-CM

## 2024-03-25 DIAGNOSIS — E11.40 TYPE 2 DIABETES MELLITUS WITH DIABETIC NEUROPATHY, WITH LONG-TERM CURRENT USE OF INSULIN (MULTI): ICD-10-CM

## 2024-03-25 LAB
POC FINGERSTICK BLOOD GLUCOSE: 132 MG/DL (ref 70–100)
POC HEMOGLOBIN A1C: 6.9 % (ref 4.2–6.5)

## 2024-03-25 PROCEDURE — 82962 GLUCOSE BLOOD TEST: CPT | Performed by: INTERNAL MEDICINE

## 2024-03-25 PROCEDURE — 4010F ACE/ARB THERAPY RXD/TAKEN: CPT | Performed by: INTERNAL MEDICINE

## 2024-03-25 PROCEDURE — 1160F RVW MEDS BY RX/DR IN RCRD: CPT | Performed by: INTERNAL MEDICINE

## 2024-03-25 PROCEDURE — 3079F DIAST BP 80-89 MM HG: CPT | Performed by: INTERNAL MEDICINE

## 2024-03-25 PROCEDURE — 3062F POS MACROALBUMINURIA REV: CPT | Performed by: INTERNAL MEDICINE

## 2024-03-25 PROCEDURE — 3077F SYST BP >= 140 MM HG: CPT | Performed by: INTERNAL MEDICINE

## 2024-03-25 PROCEDURE — 99214 OFFICE O/P EST MOD 30 MIN: CPT | Performed by: INTERNAL MEDICINE

## 2024-03-25 PROCEDURE — 83036 HEMOGLOBIN GLYCOSYLATED A1C: CPT | Performed by: INTERNAL MEDICINE

## 2024-03-25 PROCEDURE — 1159F MED LIST DOCD IN RCRD: CPT | Performed by: INTERNAL MEDICINE

## 2024-03-25 PROCEDURE — 1036F TOBACCO NON-USER: CPT | Performed by: INTERNAL MEDICINE

## 2024-03-25 RX ORDER — LANOLIN ALCOHOL/MO/W.PET/CERES
1 CREAM (GRAM) TOPICAL DAILY
Qty: 90 TABLET | Refills: 1 | Status: SHIPPED | OUTPATIENT
Start: 2024-03-25

## 2024-03-25 RX ORDER — METFORMIN HYDROCHLORIDE 500 MG/1
TABLET ORAL
Qty: 180 TABLET | Refills: 1 | Status: SHIPPED | OUTPATIENT
Start: 2024-03-25

## 2024-03-25 RX ORDER — GABAPENTIN 100 MG/1
100 CAPSULE ORAL 3 TIMES DAILY
Qty: 90 CAPSULE | Refills: 0 | Status: SHIPPED | OUTPATIENT
Start: 2024-03-25

## 2024-03-25 RX ORDER — ROSUVASTATIN CALCIUM 10 MG/1
10 TABLET, COATED ORAL DAILY
Qty: 100 TABLET | Refills: 3 | Status: SHIPPED | OUTPATIENT
Start: 2024-03-25 | End: 2025-04-29

## 2024-03-25 RX ORDER — FOLIC ACID 1 MG/1
1 TABLET ORAL DAILY
Qty: 90 TABLET | Refills: 1 | Status: SHIPPED | OUTPATIENT
Start: 2024-03-25

## 2024-03-25 ASSESSMENT — ENCOUNTER SYMPTOMS
BRUISES/BLEEDS EASILY: 0
WHEEZING: 0
HEADACHES: 0
SORE THROAT: 0
SINUS PAIN: 0
DIZZINESS: 0
ARTHRALGIAS: 0
DIFFICULTY URINATING: 0
FEVER: 0
BLOOD IN STOOL: 0
COUGH: 0
FATIGUE: 0
PALPITATIONS: 0
DIARRHEA: 0
ABDOMINAL PAIN: 0
UNEXPECTED WEIGHT CHANGE: 0

## 2024-03-25 NOTE — PROGRESS NOTES
Subjective   Patient ID: Shakira Lloyd is a 74 y.o. female who presents for Diabetes, Anemia, neuropathy, and Med Refill.    - Recent blood work reviewed  Recent mammogram within normal limits repeat in March 2024  - Patient need to proceed with bone densitometry need to schedule it as recommended patient forgot about it  - Hyperlipidemia underlying hypertension and anemia chronic kidney disease patient high risk because about statin patient agreed to start patient on small dose of Crestor 10 mg daily reevaluate patient in 6 months repeat lipid profile  - Diabetes doing very well  hemoglobin A1c 6.9 continue with diet control low-fat diet  - Hypertension patient needs a goal of blood pressure 130/80 or lower declined any change in her medication at this time patient started to monitor blood pressure at home to call if any blood pressure above 140 systolic we will monitor patient closely continue with low-salt diet  -Hyperuricemia on Uloric doing well from nephrology  -Renal insufficiency improving continue on Farxiga follow-up nephrology continue low-salt diet  -Anemia controlled improved continues iron supplement follow-up CBC continue with iron supplement  - Hypertension controlled  - Peripheral neuropathy follow-up magnesium level vitamin D vitamin B12 continues multiple vitamins  Continue gabapentin  - Arthritis continues Tylenol as needed patient given disability placard for 5 years  -History of hypothyroid toxic multinodular goiter treated by endocrinology patient off any treatment methimazole for almost a year she needs to follow-up thyroid function test order placed today follow-up results closely  Follow-up 6 months, Medicare physical      Diabetes  Pertinent negatives for hypoglycemia include no dizziness or headaches. Pertinent negatives for diabetes include no chest pain and no fatigue.   Anemia  There has been no abdominal pain, bruising/bleeding easily, fever or palpitations.   Med Refill  Pertinent  negatives include no abdominal pain, arthralgias, chest pain, congestion, coughing, fatigue, fever, headaches, rash or sore throat.          Review of Systems   Constitutional:  Negative for fatigue, fever and unexpected weight change.   HENT:  Negative for congestion, ear discharge, ear pain, mouth sores, sinus pain and sore throat.    Eyes:  Negative for visual disturbance.   Respiratory:  Negative for cough and wheezing.    Cardiovascular:  Negative for chest pain, palpitations and leg swelling.   Gastrointestinal:  Negative for abdominal pain, blood in stool and diarrhea.   Genitourinary:  Negative for difficulty urinating.   Musculoskeletal:  Negative for arthralgias.   Skin:  Negative for rash.   Neurological:  Negative for dizziness and headaches.   Hematological:  Does not bruise/bleed easily.   Psychiatric/Behavioral:  Negative for behavioral problems.    All other systems reviewed and are negative.      Objective   Lab Results   Component Value Date    HGBA1C 6.9 (A) 03/25/2024      /80   Pulse 90   Temp 36.1 °C (96.9 °F)   Wt 83.4 kg (183 lb 12.8 oz)   SpO2 97%   BMI 31.55 kg/m²   Lab Results   Component Value Date    WBC 9.2 02/26/2024    HGB 12.6 02/26/2024    HCT 39.7 02/26/2024     02/26/2024    CHOL 165 09/14/2023    TRIG 309 (H) 09/14/2023    HDL 31.0 (A) 09/14/2023    ALT 12 09/14/2023    AST 15 09/14/2023     02/26/2024    K 4.8 02/26/2024     02/26/2024    CREATININE 1.20 (H) 02/26/2024    BUN 34 (H) 02/26/2024    CO2 24 02/26/2024    TSH 1.66 09/14/2023    INR 1.1 11/22/2021    HGBA1C 6.9 (A) 03/25/2024     par   Physical Exam  Vitals and nursing note reviewed.   Constitutional:       Appearance: Normal appearance.   HENT:      Head: Normocephalic.      Nose: Nose normal.   Eyes:      Conjunctiva/sclera: Conjunctivae normal.      Pupils: Pupils are equal, round, and reactive to light.   Cardiovascular:      Rate and Rhythm: Regular rhythm.   Pulmonary:      Effort:  Pulmonary effort is normal.      Breath sounds: Normal breath sounds.   Abdominal:      General: Abdomen is flat.      Palpations: Abdomen is soft.   Musculoskeletal:      Cervical back: Neck supple.   Skin:     General: Skin is warm.   Neurological:      General: No focal deficit present.      Mental Status: She is oriented to person, place, and time.   Psychiatric:         Mood and Affect: Mood normal.         Assessment/Plan   Shakira was seen today for diabetes, anemia, neuropathy and med refill.  Diagnoses and all orders for this visit:  High risk medication use (Primary)  -     CBC and Auto Differential; Future  Type 2 diabetes mellitus with hypoglycemia without coma, without long-term current use of insulin (CMS/HCC)  -     POCT fingerstick glucose manually resulted  -     POCT glycosylated hemoglobin (Hb A1C) manually resulted  -     metFORMIN (Glucophage) 500 mg tablet; TAKE ONE TABLET BY MOUTH TWICE A DAY WITH FOOD  Anemia due to folic acid deficiency, unspecified deficiency type  -     folic acid (Folvite) 1 mg tablet; Take 1 tablet (1 mg) by mouth once daily.  Neuropathy  -     gabapentin (Neurontin) 100 mg capsule; Take 1 capsule (100 mg) by mouth 3 times a day.  Hypomagnesemia  -     magnesium oxide (Mag-Ox) 400 mg (241.3 mg magnesium) tablet; Take 1 tablet (400 mg) by mouth once daily.  Stage 3b chronic kidney disease (CMS/HCC)  Type 2 diabetes mellitus with diabetic neuropathy, with long-term current use of insulin (CMS/[facility])  Hypertension, unspecified type  -     Comprehensive Metabolic Panel; Future  Hypercholesteremia  -     Lipid Panel; Future  -     rosuvastatin (Crestor) 10 mg tablet; Take 1 tablet (10 mg) by mouth once daily.  Other fatigue  -     TSH with reflex to Free T4 if abnormal; Future  Vitamin B12 deficiency  -     Vitamin B12; Future  Other orders  -     Cancel: Follow Up In Primary Care - Established; Future  -     Follow Up In Primary Care - Established; Future   - Recent blood work  reviewed  Recent mammogram within normal limits repeat in March 2024  - Patient need to proceed with bone densitometry need to schedule it as recommended patient forgot about it  - Hyperlipidemia underlying hypertension and anemia chronic kidney disease patient high risk because about statin patient agreed to start patient on small dose of Crestor 10 mg daily reevaluate patient in 6 months repeat lipid profile  - Diabetes doing very well  hemoglobin A1c 6.9 continue with diet control low-fat diet  - Hypertension patient needs a goal of blood pressure 130/80 or lower declined any change in her medication at this time patient started to monitor blood pressure at home to call if any blood pressure above 140 systolic we will monitor patient closely continue with low-salt diet  -Hyperuricemia on Uloric doing well from nephrology  -Renal insufficiency improving continue on Farxiga follow-up nephrology continue low-salt diet  -Anemia controlled improved continues iron supplement follow-up CBC continue with iron supplement  - Hypertension controlled  - Peripheral neuropathy follow-up magnesium level vitamin D vitamin B12 continues multiple vitamins  Continue gabapentin  - Arthritis continues Tylenol as needed patient given disability placard for 5 years  -History of hypothyroid toxic multinodular goiter treated by endocrinology patient off any treatment methimazole for almost a year she needs to follow-up thyroid function test order placed today follow-up results closely  Follow-up 6 months, Medicare physical

## 2024-03-27 ENCOUNTER — OFFICE VISIT (OUTPATIENT)
Dept: NEPHROLOGY | Facility: CLINIC | Age: 74
End: 2024-03-27
Payer: MEDICARE

## 2024-03-27 VITALS
HEART RATE: 77 BPM | WEIGHT: 184.8 LBS | BODY MASS INDEX: 30.79 KG/M2 | DIASTOLIC BLOOD PRESSURE: 81 MMHG | SYSTOLIC BLOOD PRESSURE: 144 MMHG | TEMPERATURE: 97.1 F | RESPIRATION RATE: 16 BRPM | HEIGHT: 65 IN | OXYGEN SATURATION: 98 %

## 2024-03-27 DIAGNOSIS — E55.9 VITAMIN D DEFICIENCY: ICD-10-CM

## 2024-03-27 DIAGNOSIS — I10 PRIMARY HYPERTENSION: ICD-10-CM

## 2024-03-27 DIAGNOSIS — D50.0 IRON DEFICIENCY ANEMIA DUE TO CHRONIC BLOOD LOSS: ICD-10-CM

## 2024-03-27 DIAGNOSIS — Z79.4 TYPE 2 DIABETES MELLITUS WITH DIABETIC NEUROPATHY, WITH LONG-TERM CURRENT USE OF INSULIN (MULTI): ICD-10-CM

## 2024-03-27 DIAGNOSIS — N18.31 CHRONIC RENAL FAILURE, STAGE 3A (MULTI): Primary | ICD-10-CM

## 2024-03-27 DIAGNOSIS — E11.40 TYPE 2 DIABETES MELLITUS WITH DIABETIC NEUROPATHY, WITH LONG-TERM CURRENT USE OF INSULIN (MULTI): ICD-10-CM

## 2024-03-27 DIAGNOSIS — E83.42 HYPOMAGNESEMIA: ICD-10-CM

## 2024-03-27 PROCEDURE — 1160F RVW MEDS BY RX/DR IN RCRD: CPT | Performed by: INTERNAL MEDICINE

## 2024-03-27 PROCEDURE — 4010F ACE/ARB THERAPY RXD/TAKEN: CPT | Performed by: INTERNAL MEDICINE

## 2024-03-27 PROCEDURE — 1159F MED LIST DOCD IN RCRD: CPT | Performed by: INTERNAL MEDICINE

## 2024-03-27 PROCEDURE — 3062F POS MACROALBUMINURIA REV: CPT | Performed by: INTERNAL MEDICINE

## 2024-03-27 PROCEDURE — 3075F SYST BP GE 130 - 139MM HG: CPT | Performed by: INTERNAL MEDICINE

## 2024-03-27 PROCEDURE — 3078F DIAST BP <80 MM HG: CPT | Performed by: INTERNAL MEDICINE

## 2024-03-27 PROCEDURE — 99214 OFFICE O/P EST MOD 30 MIN: CPT | Performed by: INTERNAL MEDICINE

## 2024-03-27 NOTE — PROGRESS NOTES
"Subjective       Shakira Lloyd is a 74 y.o. female who has past medical history of hypertension, diabetes was coming to see me today for CKD follow-up    Last office visit July 2023.  Shakira came today with her son Matheus.  She is doing great.  Kidney function is stable with GFR around 50.  Blood pressure is excellent.  Vitamin D is now normal.  Uric acid is normal.  No anemia.  She continues to be on magnesium supplements and vitamin D.  No kidney health complaints or concerns    Prior notes     Last office visit January 2023. We started Uloric for hyperuricemia.,  Continued Farxiga and conservative measures for CKD. Shakira came today with her son Matheus. She continues to check blood pressure at home and average reading 120/60. She had recent blood work that showed stable serum creatinine and GFR within normal active lites. Uric acid is now normal. No albumin leak in the urine. Vitamin D is mildly low at 27-start vitamin D supplements. Overall she is stable. No congestive complaints or concerns today     Her prior notes     Last office visit July 2022. Shakira came today with her son, Matheus. She reports marked improvement in her fatigue since treating her anemia with IV iron. No lower urine tract symptoms. No significant leg swelling. PE was WNL today. She continues to follow low-salt diet. She does not check blood pressure at home but was educated to do so. No UTI. She has been on Farxiga 5 mg with no issues. She did blood work a week ago and all results were discussed with her in detail today including stable serum creatinine 1.2, GFR 49 and within normal electrolytes. Elevated uric acid. No albuminuria. Within normal vitamin PTH. Overall she is doing really well and her kidneys are stable     Per prior notes     Last office visit January 2022. We started SGL 2 inhibitor for GFR preservation and better diabetes control. We started IV iron for iron deficiency anemia. Shakira came today with her son \"Matheus\". She reports " "marked improvement in her fatigue. No lower urine tract symptoms. No significant leg swelling. She continues to follow low-salt diet. She does not check blood pressure at home. No UTI. She has been on Farxiga 5 mg with no issues. She did blood work a week ago and all results were discussed with her in detail today including improved serum creatinine 1, GFR 60 and within normal electrolytes. Improved iron storage and anemia. No albuminuria. Within normal vitamin D and PTH. Overall she is doing really well     Her prior notes     Shakira came today with her son. She reports being in a good state of health. She denies pain or burning with urination. She denies NSAID use. Only gabapentin or Tylenol for pain. She follows low-salt diet.  Shakira had recent hospital admission in November 2021 to [facility] for UTI/sepsis/hypoglycemia and she developed MARGOT and above CKD and serum creatinine peaked 12.3 from baseline 1.2, . MARGOT was managed with IV fluids and antibiotics. Her blood sugar medication was adjusted and metformin was decreased to 500 mg twice daily. She did not need dialysis. Repeat blood work showed improved serum creatinine 1.2     Social history: , social drinke, used to work as a   Family history: Diabetes, heart problems  Surgical history: Uterine ablation       Objective   /81 (BP Location: Left arm, Patient Position: Standing, BP Cuff Size: Large adult)   Pulse 77   Temp 36.2 °C (97.1 °F)   Resp 16   Ht 1.638 m (5' 4.5\")   Wt 83.8 kg (184 lb 12.8 oz)   SpO2 98%   BMI 31.23 kg/m²   Wt Readings from Last 3 Encounters:   03/27/24 83.8 kg (184 lb 12.8 oz)   03/25/24 83.4 kg (183 lb 12.8 oz)   09/14/23 82.4 kg (181 lb 9.6 oz)       Physical Exam    General appearance: no distress awake and alert on room air, euvolemic on exam  Eyes: non-icteric  HEENT: atrumatic head, PEERLA, moist mucosa  Skin: no apparent rash  Heart: NSR, S1, S2 normal, no murmur or gallop  Lungs: " "Symmetrical expansion,CTA bilat no wheezing/crackles  Abdomen: soft, nt/nd, obese  Extremities: no edema bilat  Neuro: No FND,asterixis, no focal deficits noticed        Review of Systems     Constitutional: no fever, no chills, no recent weight gain and no recent weight loss.   Eyes: no blurred vision and no diplopia.   ENT: no hearing loss, no earache, no sore throat, no swollen glands in the neck and no nasal discharge.   Cardiovascular: no chest pain, no palpitations and no lower extremity edema.   Respiratory: no shortness of breath, no chronic cough and no shortness of breath during exertion.   Gastrointestinal: no abdominal pain, no constipation, no heartburn, no vomiting, no bloody stools and no change in bowel movements.   Genitourinary: no dysuria and no hematuria.   Musculoskeletal: no arthralgias and no myalgias.   Skin: no rashes and no skin lesions.   Neurological: no headaches and no dizziness.   Psychiatric: no confusion, no depression and no anxiety.   Endocrine: no heat intolerance, no cold intolerance, appetite not increased, no thyroid disorder, no increased urinary frequency and no dry skin.   Hematologic/Lymphatic: does not bleed easily and does not bruise easily.   All other systems have been reviewed and are negative for complaint.         Data Review                   Lab Results   Component Value Date    URICACID 5.5 02/26/2024           Lab Results   Component Value Date    HGBA1C 6.9 (A) 03/25/2024                 No lab exists for component: \"CR\", \"PHOSPHORUS\"        Albumin/Creatine Ratio   Date Value Ref Range Status   02/26/2024   Final     Comment:     One or more analytes used in this calculation is outside of the analytical measurement range. Calculation cannot be performed.   07/26/2023 11.0 0.0 - 30.0 ug/mg crt Final   01/16/2023 SEE COMMENT 0.0 - 30.0 ug/mg crt Final     Comment:     One or more analytes used in this calculation   is outside of the analytical measurement " range.  Calculation cannot be performed.              RFP  Recent Labs     02/26/24  0853 09/14/23  0951 07/26/23  1139 01/16/23  1051 07/13/22  1000 01/11/22  0920 11/28/21  0631 11/27/21  0622 11/26/21  0656 11/22/21  1457 06/10/21  0857    137 137 137 137 136 136 134* 135*   < > 139   K 4.8 4.7 4.1 4.2 4.4 4.0 3.7 3.8 3.1*   < > 4.1    103 105 102 104 100 104 103 101   < > 108*   CO2 24 23 24 25 24 24 21 21 21   < > 21   BUN 34* 21 17 26* 21 18 22 26* 40*   < > 26*   CREATININE 1.20* 1.15* 1.25* 1.19* 1.02 1.06* 1.22* 1.48* 2.17*   < > 1.22*   GLUCOSE 129* 124* 140* 162* 143* 181* 151* 141* 133*   < > 121*   CALCIUM 10.4* 9.6 9.9 10.1 9.8 10.2 7.5* 7.0* 7.1*   < > 10.0   PHOS 3.7  --  3.5 3.7 3.3  --   --  2.4* 2.4*  --  3.2   EGFR 48*  --   --   --   --   --   --   --   --   --   --    ANIONGAP 15 16 12 14 13 16 15 14 16   < > 14    < > = values in this interval not displayed.        Urineanalysis  Recent Labs     01/16/23  1051 07/13/22  1000 01/13/22  1413 11/23/21  1213 11/23/21  0154   COLORU YELLOW STRAW YELLOW YELLOW YELLOW   APPEARANCEU CLEAR CLEAR HAZY CLEAR HAZY   SPECGRAVU 1.013 1.015 1.029 1.011 1.012   VANDANA 5.0 5.0 5.0 5.0 5.0   PROTUR NEGATIVE 13  NEGATIVE 61*  30(1+)* NEGATIVE 30(1+)*   GLUCOSEU >=500(3+)* 150(2+)* NEGATIVE NEGATIVE NEGATIVE   BLOODU NEGATIVE NEGATIVE NEGATIVE SMALL(1+)* MODERATE(2+)*   KETONESU NEGATIVE NEGATIVE NEGATIVE NEGATIVE NEGATIVE   BILIRUBINU NEGATIVE NEGATIVE NEGATIVE NEGATIVE NEGATIVE   NITRITEU NEGATIVE NEGATIVE NEGATIVE NEGATIVE NEGATIVE   LEUKOCYTESU NEGATIVE NEGATIVE SMALL(1+)* TRACE* SMALL(1+)*       Urine Electrolytes  Recent Labs     02/26/24  0853 07/26/23  1139 01/16/23  1051 07/13/22  1000 01/13/22  1413 11/23/21  1213 11/23/21  0154 06/10/21  0857   CREATU 73.3 126.0 78.6 50.0  50.0 273.0  273.0  --   --  225.0   PROTUR  --   --  NEGATIVE 13  NEGATIVE 61*  30(1+)* NEGATIVE 30(1+)*  --    UTPCR  --   --   --  0.26* 0.22*  --   --   --     ALBUMINUR <7.0  --   --   --   --   --   --   --    MICROALBCREA  --  11.0 SEE COMMENT 17.2 68.9*  --   --  33.3*        Urine Micro  Recent Labs     01/13/22  1413 11/23/21  1213 11/23/21  0154   WBCU 12* 18* 24*   RBCU 53* 1* 3*   HYALCASTU OCC*  --   --    SQUAMEPIU 26 1 1   BACTERIAU  --  3+* 3+*   MUCUSU FEW  --  FEW        Iron  Recent Labs     02/26/24  0853 01/16/23  1051 07/13/22  1000 05/11/22  1138 04/08/22  1224 03/04/22  1310 01/13/22  1413 06/10/21  0857 11/25/19  0835 05/18/18  1200   IRON 89 111 55 87 34* 57 43 29* 52 52   TIBC 304 280 249 247 239* 267 280 313 330 288   IRONSAT 29 40 22* 35 14* 21* 15* 9* 16* 18*   FERRITIN 727* 943* 1,087* 1,288* 1,144* 564* 102  --   --  93          Current Outpatient Medications on File Prior to Visit   Medication Sig Dispense Refill    acetaminophen (Tylenol) 325 mg tablet Take by mouth every 6 hours.      cholecalciferol (Vitamin D3) 50 MCG (2000 UT) tablet Take 1 tablet (50 mcg) by mouth once daily.      dapagliflozin propanediol (Farxiga) 5 mg Take 1 tablet (5 mg) by mouth once daily in the morning. Take before meals.      febuxostat (Uloric) 40 mg tablet Take 1 tablet (40 mg) by mouth once daily.      ferrous sulfate 325 (65 Fe) MG EC tablet TAKE ONE TABLET BY MOUTH EVERY DAY 90 tablet 0    folic acid (Folvite) 1 mg tablet Take 1 tablet (1 mg) by mouth once daily. 90 tablet 1    gabapentin (Neurontin) 100 mg capsule Take 1 capsule (100 mg) by mouth 3 times a day. (Patient taking differently: Take 1 capsule (100 mg) by mouth 3 times a day. Pt. Reports PRN) 90 capsule 0    losartan (Cozaar) 25 mg tablet Take 1 tablet (25 mg) by mouth once daily.      magnesium oxide (Mag-Ox) 400 mg (241.3 mg magnesium) tablet Take 1 tablet (400 mg) by mouth once daily. 90 tablet 1    metFORMIN (Glucophage) 500 mg tablet TAKE ONE TABLET BY MOUTH TWICE A DAY WITH FOOD 180 tablet 1    metoprolol succinate XL (Toprol-XL) 50 mg 24 hr tablet TAKE ONE TABLET BY MOUTH EVERY DAY 90  tablet 0    OneTouch Verio test strips strip 1 strip once daily. 90 strip 0    rosuvastatin (Crestor) 10 mg tablet Take 1 tablet (10 mg) by mouth once daily. 100 tablet 3    [DISCONTINUED] folic acid (Folvite) 1 mg tablet TAKE ONE TABLET BY MOUTH EVERY DAY 90 tablet 0    [DISCONTINUED] gabapentin (Neurontin) 100 mg capsule TAKE ONE CAPSULE BY MOUTH THREE TIMES A DAY 90 capsule 0    [DISCONTINUED] magnesium oxide (Mag-Ox) 400 mg (241.3 mg magnesium) tablet TAKE ONE TABLET BY MOUTH EVERY DAY 90 tablet 0    [DISCONTINUED] metFORMIN (Glucophage) 500 mg tablet TAKE ONE TABLET BY MOUTH TWICE A DAY WITH FOOD 180 tablet 0     No current facility-administered medications on file prior to visit.           Assessment and Plan       Shakira Lloyd  is a 74 y.o. female who has past medical history of  hypertension, diabetes was coming to see me today for CKD follow-up     Impression  # Chronic kidney disease -G3 A/A1- , stable, most likely diabetic nephropathy and atherosclerotic cardiovascular disease  -Baseline serum creatinine 1-1.2, GFR 50-60 mils per minute per 1.73 mÂ². She had MARGOT on top of CKD in November 2021 secondary to hemodynamic injury and sepsis serum creatinine was above 12 with GFR less than 15-no dialysis needed.   - Serum Cr today was 1.2 and GFR 49 mils per minute per 1.73 mÂ²  -Within normal electrolytes including sodium, potassium and no significant acidosis.  -CT scan without contrast done in November 2021 for MARGOT evaluation showed normal kidneys bilateral with no obstruction  -Continue losartan 25 mg PO qd and SGL 2 inhibitors     # Elevated Uric Acid-abnormal  - Currently at 4-5  -Continue febuxostat 40 mg PO qd     Today we discussed extensively conservative measures, diabetes and hypertension control, RAAS and SGL 2 inhibitor use        # BP - today at office is accepted with no orthostatic hypotension. Home blood pressure runs 120/60  -She follows low-salt diet. Current medication metoprolol 50 mg,  Farxiga 5 mg-, losartan 25 continue same.   -Instructed to continue to check blood pressure at home     #Anemia Hb 9.7 improved 12.8.   - She is asymptomatic. Within normal iron studies. She is status post 1 g IV iron  - No indication to continue IV iron at this time d/t controlled anemia. Will monitor     #BMD   -Borderline elevated calcium 10.4. Within normal PTH, phosphorus. Vitamin D is normal.  Will hydrate and monitor     # CVS  -On statins     #Diabetes-on metformin 500 mg twice daily reduced from prior and Farxiga 5 mg.   - Denies low blood sugar readings at home.   - Counseled regarding UTI risk     # Hypomagnesemia on supplement-we will monitor magnesium level      #Others  - No NSAIDs, no contrast as possible. If to be done- we recommend holding ACEi/ARBS/diuretics 24 hrs prior to contrast exposure and ensure appropriate hydration   - Ensure well hydration with at least 40-45 ounces a day of liquid  - Limit salt in diet to less than 2 grams per day  - No smoking     Follow-up in 12 months with repeat CKD lab and urine analysis        Alyssia Saleh MD, MS, MARGIE CHAPPELL  Clinical  - [employer] [facility] [employer]  Nephrologist - [facility]

## 2024-03-27 NOTE — PATIENT INSTRUCTIONS
Dear KAT   It was nice seeing you in the nephrology clinic today     Today we discussed the following:     #Chronic kidney disease stage III. Your kidney function is stable around 50 percent-good job     #Diabetes-continue Farxiga 5 mg for better diabetes control and kidney protection     #Hypertension-your blood pressure is in target per home check. Good job. Limit your salt intake as possible and continue same medications.      #Anemia-improved. No need for IV iron at this time     #Elevated uric acid- uric acid is normal-continue with febuxostat     #Vitamin D is normal-continue vitamin D    # Hypomagnesemia on magnesium supplement-we will check magnesium next office visit        Follow-up in 12 months with repeat blood work and urine analysis prior to visit     Please call our office if you have any question  Thank you for coming to see me today     Alyssia Saleh MD, MS, MARGIE CHAPPELL  Clinical  - [facility] [employer]  Nephrologist - [facility]

## 2024-04-16 DIAGNOSIS — I10 PRIMARY HYPERTENSION: ICD-10-CM

## 2024-04-16 DIAGNOSIS — N18.31 CHRONIC RENAL FAILURE, STAGE 3A (MULTI): Primary | ICD-10-CM

## 2024-04-17 RX ORDER — LOSARTAN POTASSIUM 25 MG/1
25 TABLET ORAL DAILY
Qty: 30 TABLET | Refills: 0 | Status: SHIPPED | OUTPATIENT
Start: 2024-04-17 | End: 2024-05-14

## 2024-05-14 DIAGNOSIS — M10.00 IDIOPATHIC GOUT, UNSPECIFIED CHRONICITY, UNSPECIFIED SITE: Primary | ICD-10-CM

## 2024-05-14 DIAGNOSIS — I10 PRIMARY HYPERTENSION: ICD-10-CM

## 2024-05-14 DIAGNOSIS — N18.31 CHRONIC RENAL FAILURE, STAGE 3A (MULTI): ICD-10-CM

## 2024-05-14 RX ORDER — LOSARTAN POTASSIUM 25 MG/1
25 TABLET ORAL DAILY
Qty: 30 TABLET | Refills: 0 | Status: SHIPPED | OUTPATIENT
Start: 2024-05-14 | End: 2024-06-10 | Stop reason: SDUPTHER

## 2024-05-14 RX ORDER — FEBUXOSTAT 40 MG/1
40 TABLET, FILM COATED ORAL DAILY
Qty: 30 TABLET | Refills: 0 | Status: SHIPPED | OUTPATIENT
Start: 2024-05-14

## 2024-06-09 DIAGNOSIS — I10 PRIMARY HYPERTENSION: ICD-10-CM

## 2024-06-09 DIAGNOSIS — N18.31 CHRONIC RENAL FAILURE, STAGE 3A (MULTI): ICD-10-CM

## 2024-06-10 RX ORDER — LOSARTAN POTASSIUM 25 MG/1
25 TABLET ORAL DAILY
Qty: 30 TABLET | Refills: 0 | Status: SHIPPED | OUTPATIENT
Start: 2024-06-10

## 2024-06-20 DIAGNOSIS — M10.00 IDIOPATHIC GOUT, UNSPECIFIED CHRONICITY, UNSPECIFIED SITE: ICD-10-CM

## 2024-06-21 RX ORDER — FEBUXOSTAT 40 MG/1
40 TABLET, FILM COATED ORAL DAILY
Qty: 30 TABLET | Refills: 0 | Status: SHIPPED | OUTPATIENT
Start: 2024-06-21

## 2024-06-23 DIAGNOSIS — D50.9 IRON DEFICIENCY ANEMIA, UNSPECIFIED IRON DEFICIENCY ANEMIA TYPE: ICD-10-CM

## 2024-06-23 DIAGNOSIS — I10 HYPERTENSION, UNSPECIFIED TYPE: ICD-10-CM

## 2024-06-24 RX ORDER — METOPROLOL SUCCINATE 50 MG/1
50 TABLET, EXTENDED RELEASE ORAL DAILY
Qty: 90 TABLET | Refills: 0 | Status: SHIPPED | OUTPATIENT
Start: 2024-06-24

## 2024-06-24 RX ORDER — FERROUS SULFATE 325(65) MG
1 TABLET, DELAYED RELEASE (ENTERIC COATED) ORAL DAILY
Qty: 90 TABLET | Refills: 0 | Status: SHIPPED | OUTPATIENT
Start: 2024-06-24

## 2024-07-15 DIAGNOSIS — I10 PRIMARY HYPERTENSION: ICD-10-CM

## 2024-07-15 DIAGNOSIS — N18.31 CHRONIC RENAL FAILURE, STAGE 3A (MULTI): ICD-10-CM

## 2024-07-15 RX ORDER — LOSARTAN POTASSIUM 25 MG/1
25 TABLET ORAL DAILY
Qty: 30 TABLET | Refills: 0 | Status: SHIPPED | OUTPATIENT
Start: 2024-07-15

## 2024-07-21 DIAGNOSIS — M10.00 IDIOPATHIC GOUT, UNSPECIFIED CHRONICITY, UNSPECIFIED SITE: ICD-10-CM

## 2024-07-29 RX ORDER — FEBUXOSTAT 40 MG/1
40 TABLET, FILM COATED ORAL DAILY
Qty: 90 TABLET | Refills: 3 | OUTPATIENT
Start: 2024-07-29 | End: 2025-07-29

## 2024-08-13 DIAGNOSIS — N18.31 CHRONIC RENAL FAILURE, STAGE 3A (MULTI): ICD-10-CM

## 2024-08-13 DIAGNOSIS — I10 PRIMARY HYPERTENSION: ICD-10-CM

## 2024-08-13 RX ORDER — LOSARTAN POTASSIUM 25 MG/1
25 TABLET ORAL DAILY
Qty: 30 TABLET | Refills: 0 | Status: SHIPPED | OUTPATIENT
Start: 2024-08-13

## 2024-08-26 DIAGNOSIS — G62.9 NEUROPATHY: ICD-10-CM

## 2024-08-27 RX ORDER — GABAPENTIN 100 MG/1
100 CAPSULE ORAL 3 TIMES DAILY
Qty: 90 CAPSULE | Refills: 0 | Status: SHIPPED | OUTPATIENT
Start: 2024-08-27

## 2024-09-05 DIAGNOSIS — Z78.0 ASYMPTOMATIC MENOPAUSAL STATE: ICD-10-CM

## 2024-09-12 ENCOUNTER — HOSPITAL ENCOUNTER (OUTPATIENT)
Dept: RADIOLOGY | Facility: HOSPITAL | Age: 74
Discharge: HOME | End: 2024-09-12
Payer: MEDICARE

## 2024-09-12 DIAGNOSIS — Z78.0 ASYMPTOMATIC MENOPAUSAL STATE: ICD-10-CM

## 2024-09-12 PROCEDURE — 77080 DXA BONE DENSITY AXIAL: CPT

## 2024-09-16 DIAGNOSIS — I10 PRIMARY HYPERTENSION: ICD-10-CM

## 2024-09-16 DIAGNOSIS — N18.31 CHRONIC RENAL FAILURE, STAGE 3A (MULTI): ICD-10-CM

## 2024-09-17 RX ORDER — LOSARTAN POTASSIUM 25 MG/1
25 TABLET ORAL DAILY
Qty: 30 TABLET | Refills: 0 | Status: SHIPPED | OUTPATIENT
Start: 2024-09-17

## 2024-09-24 DIAGNOSIS — I10 PRIMARY HYPERTENSION: ICD-10-CM

## 2024-09-24 DIAGNOSIS — N18.31 CHRONIC RENAL FAILURE, STAGE 3A (MULTI): ICD-10-CM

## 2024-09-24 DIAGNOSIS — I10 HYPERTENSION, UNSPECIFIED TYPE: ICD-10-CM

## 2024-09-24 RX ORDER — LOSARTAN POTASSIUM 25 MG/1
25 TABLET ORAL DAILY
Qty: 30 TABLET | Refills: 0 | Status: SHIPPED | OUTPATIENT
Start: 2024-09-24

## 2024-09-24 RX ORDER — METOPROLOL SUCCINATE 50 MG/1
50 TABLET, EXTENDED RELEASE ORAL DAILY
Qty: 90 TABLET | Refills: 0 | Status: SHIPPED | OUTPATIENT
Start: 2024-09-24

## 2024-09-25 ENCOUNTER — APPOINTMENT (OUTPATIENT)
Dept: PRIMARY CARE | Facility: CLINIC | Age: 74
End: 2024-09-25
Payer: MEDICARE

## 2024-09-25 VITALS
DIASTOLIC BLOOD PRESSURE: 80 MMHG | BODY MASS INDEX: 31.42 KG/M2 | WEIGHT: 188.6 LBS | HEART RATE: 67 BPM | SYSTOLIC BLOOD PRESSURE: 130 MMHG | OXYGEN SATURATION: 95 % | TEMPERATURE: 97 F | HEIGHT: 65 IN

## 2024-09-25 DIAGNOSIS — R53.83 OTHER FATIGUE: ICD-10-CM

## 2024-09-25 DIAGNOSIS — G62.9 NEUROPATHY: ICD-10-CM

## 2024-09-25 DIAGNOSIS — E55.9 VITAMIN D DEFICIENCY, UNSPECIFIED: ICD-10-CM

## 2024-09-25 DIAGNOSIS — D50.9 IRON DEFICIENCY ANEMIA, UNSPECIFIED IRON DEFICIENCY ANEMIA TYPE: ICD-10-CM

## 2024-09-25 DIAGNOSIS — E53.8 VITAMIN B12 DEFICIENCY: ICD-10-CM

## 2024-09-25 DIAGNOSIS — Z12.31 ENCOUNTER FOR SCREENING MAMMOGRAM FOR MALIGNANT NEOPLASM OF BREAST: ICD-10-CM

## 2024-09-25 DIAGNOSIS — E83.42 HYPOMAGNESEMIA: ICD-10-CM

## 2024-09-25 DIAGNOSIS — Z00.00 ROUTINE GENERAL MEDICAL EXAMINATION AT HEALTH CARE FACILITY: Primary | ICD-10-CM

## 2024-09-25 DIAGNOSIS — Z79.899 HIGH RISK MEDICATION USE: ICD-10-CM

## 2024-09-25 DIAGNOSIS — I10 HYPERTENSION, UNSPECIFIED TYPE: ICD-10-CM

## 2024-09-25 DIAGNOSIS — E11.649 TYPE 2 DIABETES MELLITUS WITH HYPOGLYCEMIA WITHOUT COMA, WITHOUT LONG-TERM CURRENT USE OF INSULIN: ICD-10-CM

## 2024-09-25 DIAGNOSIS — D52.9 ANEMIA DUE TO FOLIC ACID DEFICIENCY, UNSPECIFIED DEFICIENCY TYPE: ICD-10-CM

## 2024-09-25 DIAGNOSIS — E78.00 HYPERCHOLESTEREMIA: ICD-10-CM

## 2024-09-25 DIAGNOSIS — Z23 FLU VACCINE NEED: ICD-10-CM

## 2024-09-25 LAB
POC FINGERSTICK BLOOD GLUCOSE: 145 MG/DL (ref 70–100)
POC HEMOGLOBIN A1C: 8.3 % (ref 4.2–6.5)

## 2024-09-25 PROCEDURE — 1158F ADVNC CARE PLAN TLK DOCD: CPT | Performed by: INTERNAL MEDICINE

## 2024-09-25 PROCEDURE — 1160F RVW MEDS BY RX/DR IN RCRD: CPT | Performed by: INTERNAL MEDICINE

## 2024-09-25 PROCEDURE — G0439 PPPS, SUBSEQ VISIT: HCPCS | Performed by: INTERNAL MEDICINE

## 2024-09-25 PROCEDURE — 1170F FXNL STATUS ASSESSED: CPT | Performed by: INTERNAL MEDICINE

## 2024-09-25 PROCEDURE — 83036 HEMOGLOBIN GLYCOSYLATED A1C: CPT | Performed by: INTERNAL MEDICINE

## 2024-09-25 PROCEDURE — 3079F DIAST BP 80-89 MM HG: CPT | Performed by: INTERNAL MEDICINE

## 2024-09-25 PROCEDURE — 3008F BODY MASS INDEX DOCD: CPT | Performed by: INTERNAL MEDICINE

## 2024-09-25 PROCEDURE — 1123F ACP DISCUSS/DSCN MKR DOCD: CPT | Performed by: INTERNAL MEDICINE

## 2024-09-25 PROCEDURE — 3062F POS MACROALBUMINURIA REV: CPT | Performed by: INTERNAL MEDICINE

## 2024-09-25 PROCEDURE — 3075F SYST BP GE 130 - 139MM HG: CPT | Performed by: INTERNAL MEDICINE

## 2024-09-25 PROCEDURE — 1159F MED LIST DOCD IN RCRD: CPT | Performed by: INTERNAL MEDICINE

## 2024-09-25 PROCEDURE — 1036F TOBACCO NON-USER: CPT | Performed by: INTERNAL MEDICINE

## 2024-09-25 PROCEDURE — 4010F ACE/ARB THERAPY RXD/TAKEN: CPT | Performed by: INTERNAL MEDICINE

## 2024-09-25 PROCEDURE — 90662 IIV NO PRSV INCREASED AG IM: CPT | Performed by: INTERNAL MEDICINE

## 2024-09-25 PROCEDURE — G0008 ADMIN INFLUENZA VIRUS VAC: HCPCS | Performed by: INTERNAL MEDICINE

## 2024-09-25 PROCEDURE — 82962 GLUCOSE BLOOD TEST: CPT | Performed by: INTERNAL MEDICINE

## 2024-09-25 PROCEDURE — 99214 OFFICE O/P EST MOD 30 MIN: CPT | Performed by: INTERNAL MEDICINE

## 2024-09-25 RX ORDER — METFORMIN HYDROCHLORIDE 500 MG/1
TABLET ORAL
Qty: 180 TABLET | Refills: 1 | Status: SHIPPED | OUTPATIENT
Start: 2024-09-25

## 2024-09-25 RX ORDER — LANOLIN ALCOHOL/MO/W.PET/CERES
1 CREAM (GRAM) TOPICAL DAILY
Qty: 90 TABLET | Refills: 1 | Status: SHIPPED | OUTPATIENT
Start: 2024-09-25

## 2024-09-25 RX ORDER — FOLIC ACID 1 MG/1
1 TABLET ORAL DAILY
Qty: 90 TABLET | Refills: 1 | Status: SHIPPED | OUTPATIENT
Start: 2024-09-25

## 2024-09-25 RX ORDER — BLOOD-GLUCOSE METER
1 EACH MISCELLANEOUS DAILY
Qty: 90 STRIP | Refills: 0 | Status: SHIPPED | OUTPATIENT
Start: 2024-09-25

## 2024-09-25 RX ORDER — PNEUMOCOCCAL 20-VALENT CONJUGATE VACCINE 2.2; 2.2; 2.2; 2.2; 2.2; 2.2; 2.2; 2.2; 2.2; 2.2; 2.2; 2.2; 2.2; 2.2; 2.2; 2.2; 4.4; 2.2; 2.2; 2.2 UG/.5ML; UG/.5ML; UG/.5ML; UG/.5ML; UG/.5ML; UG/.5ML; UG/.5ML; UG/.5ML; UG/.5ML; UG/.5ML; UG/.5ML; UG/.5ML; UG/.5ML; UG/.5ML; UG/.5ML; UG/.5ML; UG/.5ML; UG/.5ML; UG/.5ML; UG/.5ML
0.5 INJECTION, SUSPENSION INTRAMUSCULAR ONCE
Qty: 0.5 ML | Refills: 0 | Status: SHIPPED | OUTPATIENT
Start: 2024-09-25 | End: 2024-09-25

## 2024-09-25 ASSESSMENT — ENCOUNTER SYMPTOMS
DIARRHEA: 0
UNEXPECTED WEIGHT CHANGE: 0
FATIGUE: 0
WHEEZING: 0
HEADACHES: 0
SORE THROAT: 0
FEVER: 0
BLOOD IN STOOL: 0
BRUISES/BLEEDS EASILY: 0
COUGH: 0
ARTHRALGIAS: 0
DIFFICULTY URINATING: 0
SINUS PAIN: 0
HYPERTENSION: 1
PALPITATIONS: 0
DIZZINESS: 0
ABDOMINAL PAIN: 0

## 2024-09-25 ASSESSMENT — PATIENT HEALTH QUESTIONNAIRE - PHQ9
SUM OF ALL RESPONSES TO PHQ9 QUESTIONS 1 AND 2: 0
1. LITTLE INTEREST OR PLEASURE IN DOING THINGS: NOT AT ALL
2. FEELING DOWN, DEPRESSED OR HOPELESS: NOT AT ALL

## 2024-09-25 ASSESSMENT — ACTIVITIES OF DAILY LIVING (ADL)
DOING_HOUSEWORK: INDEPENDENT
TAKING_MEDICATION: INDEPENDENT
GROCERY_SHOPPING: INDEPENDENT
MANAGING_FINANCES: INDEPENDENT
DRESSING: INDEPENDENT
BATHING: INDEPENDENT

## 2024-09-25 NOTE — PROGRESS NOTES
"Subjective   Patient ID: Shakira Lloyd is a 74 y.o. female who presents for Medicare Annual Wellness Visit Subsequent, Med Refill, Hypertension, Hyperlipidemia, Diabetes, and Immunizations (Flu inj given).    HPI       Review of Systems    Objective   Lab Results   Component Value Date    HGBA1C 6.9 (A) 03/25/2024      /82   Pulse 67   Temp 36.1 °C (97 °F)   Ht 1.638 m (5' 4.5\")   Wt 85.5 kg (188 lb 9.6 oz)   SpO2 95%   BMI 31.87 kg/m²   Lab Results   Component Value Date    WBC 9.2 02/26/2024    HGB 12.6 02/26/2024    HCT 39.7 02/26/2024     02/26/2024    CHOL 165 09/14/2023    TRIG 309 (H) 09/14/2023    HDL 31.0 (A) 09/14/2023    ALT 12 09/14/2023    AST 15 09/14/2023     02/26/2024    K 4.8 02/26/2024     02/26/2024    CREATININE 1.20 (H) 02/26/2024    BUN 34 (H) 02/26/2024    CO2 24 02/26/2024    TSH 1.66 09/14/2023    INR 1.1 11/22/2021    HGBA1C 8.3 (A) 09/25/2024     par   Physical Exam    Assessment/Plan   Shakira was seen today for medicare annual wellness visit subsequent, med refill, hypertension, hyperlipidemia, diabetes and immunizations.  Diagnoses and all orders for this visit:  Iron deficiency anemia, unspecified iron deficiency anemia type  Anemia due to folic acid deficiency, unspecified deficiency type  Neuropathy  Hypomagnesemia  Type 2 diabetes mellitus with hypoglycemia without coma, without long-term current use of insulin (Multi)  -     POCT fingerstick glucose manually resulted  -     POCT glycosylated hemoglobin (Hb A1C) manually resulted  Flu vaccine need  -     Flu vaccine, trivalent, preservative free, HIGH-DOSE, age 65y+ (Fluzone)  Other orders  -     Follow Up In Primary Care - Established     "

## 2024-09-25 NOTE — PROGRESS NOTES
Subjective   Reason for Visit: Shakira Lloyd is an 74 y.o. female here for a Medicare Wellness visit.     Past Medical, Surgical, and Family History reviewed and updated in chart.    Reviewed all medications by prescribing practitioner or clinical pharmacist (such as prescriptions, OTCs, herbal therapies and supplements) and documented in the medical record.    Annual Medicare physical and preventative visit  - Vaccinations reviewed need to proceed with Tdap Prevnar 20 Shingrix RSV vaccine  Flu vaccine given today  - Colonoscopy reviewed and up-to-date  - Screening mammogram reviewed and up-to-date new order placed today for next year  - Advance of directive reviewed medical screening reviewed  -Screening for osteoporosis bone density obtained  October 2024 normal results continue with calcium and vitamin D    Follow-up  -- Hyperlipidemia underlying hypertension and anemia chronic kidney disease patient high risk because about statin patient agreed to start patient on small dose of Crestor 10 mg daily reevaluate patient in 6 months repeat lipid profile  - Diabetes doing very well  hemoglobin A1c 8.3 continue with diet control low-fat diet  - Hypertension patient needs a goal of blood pressure 130/80 or lower declined any change in her medication at this time patient started to monitor blood -Hyperuricemia on Uloric doing well from nephrology  -Renal insufficiency improving continue on Farxiga follow-up nephrology continue low-salt diet.  Follow-up results  -Anemia controlled improved continues iron supplement follow-up CBC continue with iron supplement.  - Peripheral neuropathy follow-up magnesium level vitamin D vitamin B12 continues multiple vitamins  Continue gabapentin  - Arthritis continues Tylenol as needed patient given disability placard for 5 years  -History of hypothyroid toxic multinodular goiter treated by endocrinology patient off any treatment methimazole for almost a year she needs to follow-up  "thyroid function test order placed today follow-up results closely  Follow-up 6 months, complete blood work next appointment            Med Refill  Pertinent negatives include no abdominal pain, arthralgias, chest pain, congestion, coughing, fatigue, fever, headaches, rash or sore throat.   Hypertension  Pertinent negatives include no chest pain, headaches or palpitations.   Hyperlipidemia  Pertinent negatives include no chest pain.   Diabetes  Pertinent negatives for hypoglycemia include no dizziness or headaches. Pertinent negatives for diabetes include no chest pain and no fatigue.       Patient Care Team:  Seth Robles MD as PCP - General  Seth Robles MD as PCP - [facility] Attributed Provider     Review of Systems   Constitutional:  Negative for fatigue, fever and unexpected weight change.   HENT:  Negative for congestion, ear discharge, ear pain, mouth sores, sinus pain and sore throat.    Eyes:  Negative for visual disturbance.   Respiratory:  Negative for cough and wheezing.    Cardiovascular:  Negative for chest pain, palpitations and leg swelling.   Gastrointestinal:  Negative for abdominal pain, blood in stool and diarrhea.   Genitourinary:  Negative for difficulty urinating.   Musculoskeletal:  Negative for arthralgias.   Skin:  Negative for rash.   Neurological:  Negative for dizziness and headaches.   Hematological:  Does not bruise/bleed easily.   Psychiatric/Behavioral:  Negative for behavioral problems.    All other systems reviewed and are negative.      Objective   Vitals:  /80   Pulse 67   Temp 36.1 °C (97 °F)   Ht 1.638 m (5' 4.5\")   Wt 85.5 kg (188 lb 9.6 oz)   SpO2 95%   BMI 31.87 kg/m²       Physical Exam  Vitals and nursing note reviewed.   Constitutional:       Appearance: Normal appearance.   HENT:      Head: Normocephalic.      Nose: Nose normal.   Eyes:      Conjunctiva/sclera: Conjunctivae normal.      Pupils: Pupils are equal, round, and reactive to light. "   Cardiovascular:      Rate and Rhythm: Regular rhythm.   Pulmonary:      Effort: Pulmonary effort is normal.      Breath sounds: Normal breath sounds.   Abdominal:      General: Abdomen is flat.      Palpations: Abdomen is soft.   Musculoskeletal:      Cervical back: Neck supple.   Skin:     General: Skin is warm.   Neurological:      General: No focal deficit present.      Mental Status: She is oriented to person, place, and time.   Psychiatric:         Mood and Affect: Mood normal.         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    Neuropathy         Hypomagnesemia    Orders:    magnesium oxide (Mag-Ox) 400 mg (241.3 mg magnesium) tablet; Take 1 tablet (400 mg) by mouth once daily.    Type 2 diabetes mellitus with hypoglycemia without coma, without long-term current use of insulin    Orders:    POCT fingerstick glucose manually resulted    POCT glycosylated hemoglobin (Hb A1C) manually resulted    metFORMIN (Glucophage) 500 mg tablet; TAKE ONE TABLET BY MOUTH TWICE A DAY WITH FOOD    OneTouch Verio test strips strip; 1 strip once daily.    Flu vaccine need    Orders:    Flu vaccine, trivalent, preservative free, HIGH-DOSE, age 65y+ (Fluzone)    Routine general medical examination at health care facility    Orders:    1 Year Follow Up In Primary Care - Wellness Exam; Future    diphth,pertus,acell,,tetanus (BoostRIX) 2.5-8-5 Lf-mcg-Lf/0.5mL injection; Inject 0.5 mL into the muscle 1 time for 1 dose.    zoster vaccine-recombinant adjuvanted (Shingrix) 50 mcg/0.5 mL vaccine; Inject 0.5 mL into the muscle 1 time for 1 dose.    pneumoc 20-shaan conj-dip cr,PF, (Prevnar 20, PF,) 0.5 mL vaccine; Inject 0.5 mL into the muscle 1 time for 1 dose.    High risk medication use    Orders:    CBC and Auto Differential;  Future    Hypertension, unspecified type    Orders:    Comprehensive Metabolic Panel; Future    Hypercholesteremia    Orders:    Lipid Panel; Future    Other fatigue    Orders:    TSH with reflex to Free T4 if abnormal; Future    Vitamin B12 deficiency    Orders:    Vitamin B12; Future    Vitamin D deficiency, unspecified    Orders:    Vitamin D 25-Hydroxy,Total (for eval of Vitamin D levels); Future    Encounter for screening mammogram for malignant neoplasm of breast    Orders:    BI mammo bilateral screening tomosynthesis; Future     Annual Medicare physical and preventative visit  - Vaccinations reviewed need to proceed with Tdap Prevnar 20 Shingrix RSV vaccine  Flu vaccine given today  - Colonoscopy reviewed and up-to-date  - Screening mammogram reviewed and up-to-date new order placed today for next year  - Advance of directive reviewed medical screening reviewed  -Screening for osteoporosis bone density obtained  October 2024 normal results continue with calcium and vitamin D    Follow-up  -- Hyperlipidemia underlying hypertension and anemia chronic kidney disease patient high risk because about statin patient agreed to start patient on small dose of Crestor 10 mg daily reevaluate patient in 6 months repeat lipid profile  - Diabetes doing very well  hemoglobin A1c 8.3 continue with diet control low-fat diet  - Hypertension patient needs a goal of blood pressure 130/80 or lower declined any change in her medication at this time patient started to monitor blood -Hyperuricemia on Uloric doing well from nephrology  -Renal insufficiency improving continue on Farxiga follow-up nephrology continue low-salt diet.  Follow-up results  -Anemia controlled improved continues iron supplement follow-up CBC continue with iron supplement.  - Peripheral neuropathy follow-up magnesium level vitamin D vitamin B12 continues multiple vitamins  Continue gabapentin  - Arthritis continues Tylenol as needed patient given disability  placard for 5 years  -History of hypothyroid toxic multinodular goiter treated by endocrinology patient off any treatment methimazole for almost a year she needs to follow-up thyroid function test order placed today follow-up results closely  Follow-up 6 months, complete blood work next appointment

## 2024-09-29 DIAGNOSIS — D50.9 IRON DEFICIENCY ANEMIA, UNSPECIFIED IRON DEFICIENCY ANEMIA TYPE: ICD-10-CM

## 2024-09-30 RX ORDER — FERROUS SULFATE 325(65) MG
1 TABLET, DELAYED RELEASE (ENTERIC COATED) ORAL DAILY
Qty: 90 TABLET | Refills: 0 | Status: SHIPPED | OUTPATIENT
Start: 2024-09-30

## 2024-11-06 DIAGNOSIS — N18.31 CHRONIC RENAL FAILURE, STAGE 3A (MULTI): ICD-10-CM

## 2024-11-06 DIAGNOSIS — Z79.4 TYPE 2 DIABETES MELLITUS WITH DIABETIC NEUROPATHY, WITH LONG-TERM CURRENT USE OF INSULIN: Primary | ICD-10-CM

## 2024-11-06 DIAGNOSIS — E11.40 TYPE 2 DIABETES MELLITUS WITH DIABETIC NEUROPATHY, WITH LONG-TERM CURRENT USE OF INSULIN: Primary | ICD-10-CM

## 2024-11-06 RX ORDER — DAPAGLIFLOZIN 5 MG/1
5 TABLET, FILM COATED ORAL
Qty: 90 TABLET | Refills: 3 | Status: SHIPPED | OUTPATIENT
Start: 2024-11-06 | End: 2025-11-06

## 2024-11-17 DIAGNOSIS — I10 PRIMARY HYPERTENSION: ICD-10-CM

## 2024-11-17 DIAGNOSIS — N18.31 CHRONIC RENAL FAILURE, STAGE 3A (MULTI): ICD-10-CM

## 2024-11-17 DIAGNOSIS — M10.00 IDIOPATHIC GOUT, UNSPECIFIED CHRONICITY, UNSPECIFIED SITE: ICD-10-CM

## 2024-11-18 RX ORDER — FEBUXOSTAT 40 MG/1
40 TABLET, FILM COATED ORAL DAILY
Qty: 30 TABLET | Refills: 0 | Status: SHIPPED | OUTPATIENT
Start: 2024-11-18

## 2024-11-18 RX ORDER — LOSARTAN POTASSIUM 25 MG/1
25 TABLET ORAL DAILY
Qty: 30 TABLET | Refills: 0 | Status: SHIPPED | OUTPATIENT
Start: 2024-11-18

## 2024-12-16 DIAGNOSIS — N18.31 CHRONIC RENAL FAILURE, STAGE 3A (MULTI): ICD-10-CM

## 2024-12-16 DIAGNOSIS — I10 PRIMARY HYPERTENSION: ICD-10-CM

## 2024-12-16 DIAGNOSIS — I10 HYPERTENSION, UNSPECIFIED TYPE: ICD-10-CM

## 2024-12-17 RX ORDER — LOSARTAN POTASSIUM 25 MG/1
25 TABLET ORAL DAILY
Qty: 30 TABLET | Refills: 0 | Status: SHIPPED | OUTPATIENT
Start: 2024-12-17

## 2024-12-17 RX ORDER — METOPROLOL SUCCINATE 50 MG/1
50 TABLET, EXTENDED RELEASE ORAL DAILY
Qty: 90 TABLET | Refills: 0 | Status: SHIPPED | OUTPATIENT
Start: 2024-12-17

## 2024-12-23 ENCOUNTER — TELEPHONE (OUTPATIENT)
Dept: NEPHROLOGY | Facility: HOSPITAL | Age: 74
End: 2024-12-23
Payer: MEDICARE

## 2024-12-23 DIAGNOSIS — D50.9 IRON DEFICIENCY ANEMIA, UNSPECIFIED IRON DEFICIENCY ANEMIA TYPE: ICD-10-CM

## 2024-12-23 DIAGNOSIS — M10.00 IDIOPATHIC GOUT, UNSPECIFIED CHRONICITY, UNSPECIFIED SITE: ICD-10-CM

## 2024-12-23 RX ORDER — FEBUXOSTAT 40 MG/1
40 TABLET, FILM COATED ORAL DAILY
Qty: 30 TABLET | Refills: 0 | Status: SHIPPED | OUTPATIENT
Start: 2024-12-23

## 2024-12-23 RX ORDER — FERROUS SULFATE 325(65) MG
1 TABLET, DELAYED RELEASE (ENTERIC COATED) ORAL DAILY
Qty: 90 TABLET | Refills: 1 | Status: SHIPPED | OUTPATIENT
Start: 2024-12-23

## 2025-01-06 DIAGNOSIS — G62.9 NEUROPATHY: ICD-10-CM

## 2025-01-06 RX ORDER — GABAPENTIN 100 MG/1
100 CAPSULE ORAL 3 TIMES DAILY
Qty: 90 CAPSULE | Refills: 0 | Status: SHIPPED | OUTPATIENT
Start: 2025-01-06

## 2025-01-14 DIAGNOSIS — E11.40 TYPE 2 DIABETES MELLITUS WITH DIABETIC NEUROPATHY, WITH LONG-TERM CURRENT USE OF INSULIN: ICD-10-CM

## 2025-01-14 DIAGNOSIS — N18.31 CHRONIC RENAL FAILURE, STAGE 3A (MULTI): ICD-10-CM

## 2025-01-14 DIAGNOSIS — Z79.4 TYPE 2 DIABETES MELLITUS WITH DIABETIC NEUROPATHY, WITH LONG-TERM CURRENT USE OF INSULIN: ICD-10-CM

## 2025-01-14 RX ORDER — DAPAGLIFLOZIN 5 MG/1
5 TABLET, FILM COATED ORAL
Qty: 90 TABLET | Refills: 3 | Status: SHIPPED | OUTPATIENT
Start: 2025-01-14 | End: 2026-01-14

## 2025-01-16 DIAGNOSIS — N18.31 CHRONIC RENAL FAILURE, STAGE 3A (MULTI): ICD-10-CM

## 2025-01-16 DIAGNOSIS — I10 PRIMARY HYPERTENSION: ICD-10-CM

## 2025-01-16 RX ORDER — LOSARTAN POTASSIUM 25 MG/1
25 TABLET ORAL DAILY
Qty: 30 TABLET | Refills: 0 | Status: SHIPPED | OUTPATIENT
Start: 2025-01-16

## 2025-01-29 DIAGNOSIS — M10.00 IDIOPATHIC GOUT, UNSPECIFIED CHRONICITY, UNSPECIFIED SITE: ICD-10-CM

## 2025-01-30 RX ORDER — FEBUXOSTAT 40 MG/1
40 TABLET, FILM COATED ORAL DAILY
Qty: 30 TABLET | Refills: 0 | Status: SHIPPED | OUTPATIENT
Start: 2025-01-30

## 2025-02-20 DIAGNOSIS — N18.31 CHRONIC RENAL FAILURE, STAGE 3A (MULTI): ICD-10-CM

## 2025-02-20 DIAGNOSIS — M10.00 IDIOPATHIC GOUT, UNSPECIFIED CHRONICITY, UNSPECIFIED SITE: ICD-10-CM

## 2025-02-20 DIAGNOSIS — I10 PRIMARY HYPERTENSION: ICD-10-CM

## 2025-02-21 RX ORDER — LOSARTAN POTASSIUM 25 MG/1
25 TABLET ORAL DAILY
Qty: 30 TABLET | Refills: 0 | Status: SHIPPED | OUTPATIENT
Start: 2025-02-21

## 2025-02-21 RX ORDER — FEBUXOSTAT 40 MG/1
40 TABLET, FILM COATED ORAL DAILY
Qty: 30 TABLET | Refills: 0 | Status: SHIPPED | OUTPATIENT
Start: 2025-02-21

## 2025-03-21 DIAGNOSIS — I10 PRIMARY HYPERTENSION: ICD-10-CM

## 2025-03-21 DIAGNOSIS — N18.31 CHRONIC RENAL FAILURE, STAGE 3A (MULTI): ICD-10-CM

## 2025-03-24 RX ORDER — LOSARTAN POTASSIUM 25 MG/1
25 TABLET ORAL DAILY
Qty: 30 TABLET | Refills: 0 | Status: SHIPPED | OUTPATIENT
Start: 2025-03-24 | End: 2025-03-26 | Stop reason: SDUPTHER

## 2025-03-26 ENCOUNTER — APPOINTMENT (OUTPATIENT)
Dept: PRIMARY CARE | Facility: CLINIC | Age: 75
End: 2025-03-26
Payer: MEDICARE

## 2025-03-26 VITALS
TEMPERATURE: 97.3 F | HEART RATE: 77 BPM | OXYGEN SATURATION: 96 % | BODY MASS INDEX: 32.68 KG/M2 | DIASTOLIC BLOOD PRESSURE: 90 MMHG | WEIGHT: 193.4 LBS | SYSTOLIC BLOOD PRESSURE: 170 MMHG

## 2025-03-26 DIAGNOSIS — R53.83 OTHER FATIGUE: ICD-10-CM

## 2025-03-26 DIAGNOSIS — R73.09 ABNORMAL BLOOD SUGAR: ICD-10-CM

## 2025-03-26 DIAGNOSIS — E83.42 HYPOMAGNESEMIA: ICD-10-CM

## 2025-03-26 DIAGNOSIS — E11.40 TYPE 2 DIABETES MELLITUS WITH DIABETIC NEUROPATHY, WITH LONG-TERM CURRENT USE OF INSULIN: ICD-10-CM

## 2025-03-26 DIAGNOSIS — N18.31 CHRONIC RENAL FAILURE, STAGE 3A (MULTI): ICD-10-CM

## 2025-03-26 DIAGNOSIS — Z79.899 HIGH RISK MEDICATION USE: Primary | ICD-10-CM

## 2025-03-26 DIAGNOSIS — E55.9 VITAMIN D DEFICIENCY, UNSPECIFIED: ICD-10-CM

## 2025-03-26 DIAGNOSIS — I10 PRIMARY HYPERTENSION: ICD-10-CM

## 2025-03-26 DIAGNOSIS — Z79.4 TYPE 2 DIABETES MELLITUS WITH DIABETIC NEUROPATHY, WITH LONG-TERM CURRENT USE OF INSULIN: ICD-10-CM

## 2025-03-26 DIAGNOSIS — E78.00 HYPERCHOLESTEREMIA: ICD-10-CM

## 2025-03-26 DIAGNOSIS — E53.8 VITAMIN B12 DEFICIENCY: ICD-10-CM

## 2025-03-26 DIAGNOSIS — I10 HYPERTENSION, UNSPECIFIED TYPE: ICD-10-CM

## 2025-03-26 PROCEDURE — 1160F RVW MEDS BY RX/DR IN RCRD: CPT | Performed by: INTERNAL MEDICINE

## 2025-03-26 PROCEDURE — 1036F TOBACCO NON-USER: CPT | Performed by: INTERNAL MEDICINE

## 2025-03-26 PROCEDURE — G2211 COMPLEX E/M VISIT ADD ON: HCPCS | Performed by: INTERNAL MEDICINE

## 2025-03-26 PROCEDURE — 99214 OFFICE O/P EST MOD 30 MIN: CPT | Performed by: INTERNAL MEDICINE

## 2025-03-26 PROCEDURE — 4010F ACE/ARB THERAPY RXD/TAKEN: CPT | Performed by: INTERNAL MEDICINE

## 2025-03-26 PROCEDURE — 1159F MED LIST DOCD IN RCRD: CPT | Performed by: INTERNAL MEDICINE

## 2025-03-26 PROCEDURE — 3080F DIAST BP >= 90 MM HG: CPT | Performed by: INTERNAL MEDICINE

## 2025-03-26 PROCEDURE — 3077F SYST BP >= 140 MM HG: CPT | Performed by: INTERNAL MEDICINE

## 2025-03-26 RX ORDER — LANOLIN ALCOHOL/MO/W.PET/CERES
1 CREAM (GRAM) TOPICAL DAILY
Qty: 90 TABLET | Refills: 0 | Status: SHIPPED | OUTPATIENT
Start: 2025-03-26

## 2025-03-26 RX ORDER — LOSARTAN POTASSIUM 50 MG/1
50 TABLET ORAL DAILY
Qty: 90 TABLET | Refills: 3 | Status: SHIPPED | OUTPATIENT
Start: 2025-03-26 | End: 2026-03-26

## 2025-03-26 ASSESSMENT — ENCOUNTER SYMPTOMS
DIZZINESS: 0
PALPITATIONS: 0
SORE THROAT: 0
ARTHRALGIAS: 0
DIARRHEA: 0
FATIGUE: 0
BRUISES/BLEEDS EASILY: 0
BLOOD IN STOOL: 0
ABDOMINAL PAIN: 0
COUGH: 0
FEVER: 0
HEADACHES: 0
DIFFICULTY URINATING: 0
WHEEZING: 0
UNEXPECTED WEIGHT CHANGE: 0
SINUS PAIN: 0
HYPERTENSION: 1

## 2025-03-26 NOTE — PROGRESS NOTES
Subjective   Patient ID: Shakira Lloyd is a 75 y.o. female who presents for Diabetes and Hypertension.    - Uncontrolled hypertension  - Patient need to increase losartan to take 50 mg daily  Proceed with blood work today  Repeat patient in 3 months recheck blood pressure  - Hyperlipidemia continue with Crestor 10 mg follow-up lipid profile  -- Diabetes doing very well  hemoglobin A1c 8.3 continue with diet control low-fat diet  - Hypertension patient needs a goal of blood pressure 130/80 or lower declined any change in her medication at this time patient started to monitor blood -Hyperuricemia on Uloric doing well from nephrology  -Renal insufficiency improving continue on Farxiga follow-up nephrology continue low-salt diet.  Follow-up results  -Anemia controlled improved continues iron supplement follow-up CBC continue with iron supplement.  - Peripheral neuropathy follow-up magnesium level vitamin D vitamin B12 continues multiple vitamins  Continue gabapentin  - Arthritis continues Tylenol as needed patient given disability placard for 5 years  -History of hypothyroid toxic multinodular goiter treated by endocrinology patient off any treatment methimazole for almost a year she needs to follow-up thyroid function test order placed today follow-up results closely  Follow-up 3 months    Diabetes  Pertinent negatives for hypoglycemia include no dizziness or headaches. Pertinent negatives for diabetes include no chest pain and no fatigue.   Hypertension  Pertinent negatives include no chest pain, headaches or palpitations.        Lab Results   Component Value Date    WBC 9.2 02/26/2024    HGB 12.6 02/26/2024    HCT 39.7 02/26/2024     02/26/2024    CHOL 165 09/14/2023    TRIG 309 (H) 09/14/2023    HDL 31.0 (A) 09/14/2023    ALT 12 09/14/2023    AST 15 09/14/2023     02/26/2024    K 4.8 02/26/2024     02/26/2024    CREATININE 1.20 (H) 02/26/2024    BUN 34 (H) 02/26/2024    CO2 24 02/26/2024    TSH  1.66 09/14/2023    INR 1.1 11/22/2021    HGBA1C 8.3 (A) 09/25/2024     par     Review of Systems   Constitutional:  Negative for fatigue, fever and unexpected weight change.   HENT:  Negative for congestion, ear discharge, ear pain, mouth sores, sinus pain and sore throat.    Eyes:  Negative for visual disturbance.   Respiratory:  Negative for cough and wheezing.    Cardiovascular:  Negative for chest pain, palpitations and leg swelling.   Gastrointestinal:  Negative for abdominal pain, blood in stool and diarrhea.   Genitourinary:  Negative for difficulty urinating.   Musculoskeletal:  Negative for arthralgias.   Skin:  Negative for rash.   Neurological:  Negative for dizziness and headaches.   Hematological:  Does not bruise/bleed easily.   Psychiatric/Behavioral:  Negative for behavioral problems.    All other systems reviewed and are negative.      Objective   Lab Results   Component Value Date    HGBA1C 8.3 (A) 09/25/2024      /90   Pulse 77   Temp 36.3 °C (97.3 °F)   Wt 87.7 kg (193 lb 6.4 oz)   SpO2 96%   BMI 32.68 kg/m²     Physical Exam  Vitals and nursing note reviewed.   Constitutional:       Appearance: Normal appearance.   HENT:      Head: Normocephalic.      Nose: Nose normal.   Eyes:      Conjunctiva/sclera: Conjunctivae normal.      Pupils: Pupils are equal, round, and reactive to light.   Cardiovascular:      Rate and Rhythm: Regular rhythm.   Pulmonary:      Effort: Pulmonary effort is normal.      Breath sounds: Normal breath sounds.   Abdominal:      General: Abdomen is flat.      Palpations: Abdomen is soft.   Musculoskeletal:      Cervical back: Neck supple.   Skin:     General: Skin is warm.   Neurological:      General: No focal deficit present.      Mental Status: She is oriented to person, place, and time.   Psychiatric:         Mood and Affect: Mood normal.         Assessment/Plan   Shakira was seen today for diabetes and hypertension.  Diagnoses and all orders for this  visit:  High risk medication use (Primary)  -     CBC and Auto Differential; Future  -     CBC and Auto Differential  Chronic renal failure, stage 3a (Multi)  -     losartan (Cozaar) 50 mg tablet; Take 1 tablet (50 mg) by mouth once daily.  -     Albumin-Creatinine Ratio, Urine Random; Future  -     Albumin-Creatinine Ratio, Urine Random  Primary hypertension  -     losartan (Cozaar) 50 mg tablet; Take 1 tablet (50 mg) by mouth once daily.  Hypertension, unspecified type  -     Comprehensive Metabolic Panel; Future  -     Comprehensive Metabolic Panel  Abnormal blood sugar  -     Albumin-Creatinine Ratio, Urine Random; Future  -     Hemoglobin A1C; Future  -     Albumin-Creatinine Ratio, Urine Random  -     Hemoglobin A1C  Hypercholesteremia  -     Lipid Panel; Future  -     Lipid Panel  Other fatigue  -     TSH with reflex to Free T4 if abnormal; Future  -     TSH with reflex to Free T4 if abnormal  Vitamin B12 deficiency  -     Vitamin B12; Future  -     Vitamin B12  Vitamin D deficiency, unspecified  -     Vitamin D 25-Hydroxy,Total (for eval of Vitamin D levels); Future  -     Vitamin D 25-Hydroxy,Total (for eval of Vitamin D levels)  Type 2 diabetes mellitus with diabetic neuropathy, with long-term current use of insulin  -     Albumin-Creatinine Ratio, Urine Random; Future  -     Albumin-Creatinine Ratio, Urine Random  Other orders  -     Follow Up In Primary Care - Established  -     Follow Up In Primary Care - Established; Future   -- Uncontrolled hypertension  - Patient need to increase losartan to take 50 mg daily  Proceed with blood work today  Repeat patient in 3 months recheck blood pressure  - Hyperlipidemia continue with Crestor 10 mg follow-up lipid profile  -- Diabetes doing very well  hemoglobin A1c 8.3 continue with diet control low-fat diet  - Hypertension patient needs a goal of blood pressure 130/80 or lower declined any change in her medication at this time patient started to monitor blood  -Hyperuricemia on Uloric doing well from nephrology  -Renal insufficiency improving continue on Farxiga follow-up nephrology continue low-salt diet.  Follow-up results  -Anemia controlled improved continues iron supplement follow-up CBC continue with iron supplement.  - Peripheral neuropathy follow-up magnesium level vitamin D vitamin B12 continues multiple vitamins  Continue gabapentin  - Arthritis continues Tylenol as needed patient given disability placard for 5 years  -History of hypothyroid toxic multinodular goiter treated by endocrinology patient off any treatment methimazole for almost a year she needs to follow-up thyroid function test order placed today follow-up results closely  Follow-up 3 months

## 2025-03-27 ENCOUNTER — APPOINTMENT (OUTPATIENT)
Dept: NEPHROLOGY | Facility: CLINIC | Age: 75
End: 2025-03-27
Payer: MEDICARE

## 2025-03-27 DIAGNOSIS — M10.00 IDIOPATHIC GOUT, UNSPECIFIED CHRONICITY, UNSPECIFIED SITE: ICD-10-CM

## 2025-03-27 DIAGNOSIS — I10 HYPERTENSION, UNSPECIFIED TYPE: ICD-10-CM

## 2025-03-27 LAB
25(OH)D3+25(OH)D2 SERPL-MCNC: 43 NG/ML (ref 30–100)
ALBUMIN SERPL-MCNC: 4.4 G/DL (ref 3.6–5.1)
ALBUMIN/CREAT UR: 11 MG/G CREAT
ALP SERPL-CCNC: 43 U/L (ref 37–153)
ALT SERPL-CCNC: 22 U/L (ref 6–29)
ANION GAP SERPL CALCULATED.4IONS-SCNC: 11 MMOL/L (CALC) (ref 7–17)
AST SERPL-CCNC: 23 U/L (ref 10–35)
BASOPHILS # BLD AUTO: 28 CELLS/UL (ref 0–200)
BASOPHILS NFR BLD AUTO: 0.4 %
BILIRUB SERPL-MCNC: 0.5 MG/DL (ref 0.2–1.2)
BUN SERPL-MCNC: 27 MG/DL (ref 7–25)
CALCIUM SERPL-MCNC: 9.7 MG/DL (ref 8.6–10.4)
CHLORIDE SERPL-SCNC: 103 MMOL/L (ref 98–110)
CHOLEST SERPL-MCNC: 112 MG/DL
CHOLEST/HDLC SERPL: 2.7 (CALC)
CO2 SERPL-SCNC: 23 MMOL/L (ref 20–32)
CREAT SERPL-MCNC: 1.36 MG/DL (ref 0.6–1)
CREAT UR-MCNC: 131 MG/DL (ref 20–275)
EGFRCR SERPLBLD CKD-EPI 2021: 41 ML/MIN/1.73M2
EOSINOPHIL # BLD AUTO: 217 CELLS/UL (ref 15–500)
EOSINOPHIL NFR BLD AUTO: 3.1 %
ERYTHROCYTE [DISTWIDTH] IN BLOOD BY AUTOMATED COUNT: 13.1 % (ref 11–15)
EST. AVERAGE GLUCOSE BLD GHB EST-MCNC: 229 MG/DL
EST. AVERAGE GLUCOSE BLD GHB EST-SCNC: 12.7 MMOL/L
GLUCOSE SERPL-MCNC: 204 MG/DL (ref 65–99)
HBA1C MFR BLD: 9.6 % OF TOTAL HGB
HCT VFR BLD AUTO: 36.9 % (ref 35–45)
HDLC SERPL-MCNC: 41 MG/DL
HGB BLD-MCNC: 12.3 G/DL (ref 11.7–15.5)
LDLC SERPL CALC-MCNC: 44 MG/DL (CALC)
LYMPHOCYTES # BLD AUTO: 2205 CELLS/UL (ref 850–3900)
LYMPHOCYTES NFR BLD AUTO: 31.5 %
MCH RBC QN AUTO: 30.1 PG (ref 27–33)
MCHC RBC AUTO-ENTMCNC: 33.3 G/DL (ref 32–36)
MCV RBC AUTO: 90.2 FL (ref 80–100)
MICROALBUMIN UR-MCNC: 1.5 MG/DL
MONOCYTES # BLD AUTO: 658 CELLS/UL (ref 200–950)
MONOCYTES NFR BLD AUTO: 9.4 %
NEUTROPHILS # BLD AUTO: 3892 CELLS/UL (ref 1500–7800)
NEUTROPHILS NFR BLD AUTO: 55.6 %
NONHDLC SERPL-MCNC: 71 MG/DL (CALC)
PLATELET # BLD AUTO: 219 THOUSAND/UL (ref 140–400)
PMV BLD REES-ECKER: 9.8 FL (ref 7.5–12.5)
POTASSIUM SERPL-SCNC: 4.8 MMOL/L (ref 3.5–5.3)
PROT SERPL-MCNC: 7.5 G/DL (ref 6.1–8.1)
RBC # BLD AUTO: 4.09 MILLION/UL (ref 3.8–5.1)
SODIUM SERPL-SCNC: 137 MMOL/L (ref 135–146)
TRIGL SERPL-MCNC: 199 MG/DL
TSH SERPL-ACNC: 2.45 MIU/L (ref 0.4–4.5)
VIT B12 SERPL-MCNC: 286 PG/ML (ref 200–1100)
WBC # BLD AUTO: 7 THOUSAND/UL (ref 3.8–10.8)

## 2025-03-28 RX ORDER — FEBUXOSTAT 40 MG/1
40 TABLET, FILM COATED ORAL DAILY
Qty: 30 TABLET | Refills: 0 | Status: SHIPPED | OUTPATIENT
Start: 2025-03-28

## 2025-03-31 RX ORDER — METOPROLOL SUCCINATE 50 MG/1
50 TABLET, EXTENDED RELEASE ORAL DAILY
Qty: 90 TABLET | Refills: 0 | Status: SHIPPED | OUTPATIENT
Start: 2025-03-31

## 2025-04-02 ENCOUNTER — APPOINTMENT (OUTPATIENT)
Dept: NEPHROLOGY | Facility: CLINIC | Age: 75
End: 2025-04-02
Payer: MEDICARE

## 2025-04-02 DIAGNOSIS — N18.32 STAGE 3B CHRONIC KIDNEY DISEASE (MULTI): ICD-10-CM

## 2025-04-03 LAB
25(OH)D3+25(OH)D2 SERPL-MCNC: 42 NG/ML (ref 30–100)
ALBUMIN SERPL-MCNC: 4.5 G/DL (ref 3.6–5.1)
ALBUMIN/CREAT UR: 26 MG/G CREAT
BUN SERPL-MCNC: 19 MG/DL (ref 7–25)
BUN/CREAT SERPL: 16 (CALC) (ref 6–22)
CALCIUM SERPL-MCNC: 9.8 MG/DL (ref 8.6–10.4)
CHLORIDE SERPL-SCNC: 104 MMOL/L (ref 98–110)
CO2 SERPL-SCNC: 25 MMOL/L (ref 20–32)
CREAT SERPL-MCNC: 1.21 MG/DL (ref 0.6–1)
CREAT UR-MCNC: 99 MG/DL (ref 20–275)
EGFRCR SERPLBLD CKD-EPI 2021: 47 ML/MIN/1.73M2
ERYTHROCYTE [DISTWIDTH] IN BLOOD BY AUTOMATED COUNT: 12.9 % (ref 11–15)
FERRITIN SERPL-MCNC: 660 NG/ML (ref 16–288)
GLUCOSE SERPL-MCNC: 172 MG/DL (ref 65–99)
HCT VFR BLD AUTO: 39.6 % (ref 35–45)
HGB BLD-MCNC: 12.9 G/DL (ref 11.7–15.5)
IRON SATN MFR SERPL: 22 % (CALC) (ref 16–45)
IRON SERPL-MCNC: 68 MCG/DL (ref 45–160)
MCH RBC QN AUTO: 29.6 PG (ref 27–33)
MCHC RBC AUTO-ENTMCNC: 32.6 G/DL (ref 32–36)
MCV RBC AUTO: 90.8 FL (ref 80–100)
MICROALBUMIN UR-MCNC: 2.6 MG/DL
PHOSPHATE SERPL-MCNC: 3.6 MG/DL (ref 2.1–4.3)
PLATELET # BLD AUTO: 255 THOUSAND/UL (ref 140–400)
PMV BLD REES-ECKER: 9.8 FL (ref 7.5–12.5)
POTASSIUM SERPL-SCNC: 4.9 MMOL/L (ref 3.5–5.3)
PTH-INTACT SERPL-MCNC: 62 PG/ML (ref 16–77)
RBC # BLD AUTO: 4.36 MILLION/UL (ref 3.8–5.1)
SODIUM SERPL-SCNC: 138 MMOL/L (ref 135–146)
TIBC SERPL-MCNC: 314 MCG/DL (CALC) (ref 250–450)
WBC # BLD AUTO: 7.4 THOUSAND/UL (ref 3.8–10.8)

## 2025-04-08 ENCOUNTER — APPOINTMENT (OUTPATIENT)
Dept: NEPHROLOGY | Facility: CLINIC | Age: 75
End: 2025-04-08
Payer: MEDICARE

## 2025-04-08 VITALS
OXYGEN SATURATION: 97 % | BODY MASS INDEX: 31.69 KG/M2 | HEART RATE: 76 BPM | SYSTOLIC BLOOD PRESSURE: 128 MMHG | DIASTOLIC BLOOD PRESSURE: 78 MMHG | HEIGHT: 65 IN | TEMPERATURE: 97.3 F | WEIGHT: 190.2 LBS

## 2025-04-08 DIAGNOSIS — N18.31 CHRONIC RENAL FAILURE, STAGE 3A (MULTI): Primary | ICD-10-CM

## 2025-04-08 DIAGNOSIS — E55.9 VITAMIN D DEFICIENCY: ICD-10-CM

## 2025-04-08 DIAGNOSIS — E11.40 TYPE 2 DIABETES MELLITUS WITH DIABETIC NEUROPATHY, WITH LONG-TERM CURRENT USE OF INSULIN: ICD-10-CM

## 2025-04-08 DIAGNOSIS — I10 PRIMARY HYPERTENSION: ICD-10-CM

## 2025-04-08 DIAGNOSIS — E83.42 HYPOMAGNESEMIA: ICD-10-CM

## 2025-04-08 DIAGNOSIS — Z79.4 TYPE 2 DIABETES MELLITUS WITH DIABETIC NEUROPATHY, WITH LONG-TERM CURRENT USE OF INSULIN: ICD-10-CM

## 2025-04-08 DIAGNOSIS — D50.0 IRON DEFICIENCY ANEMIA DUE TO CHRONIC BLOOD LOSS: ICD-10-CM

## 2025-04-08 PROCEDURE — 4010F ACE/ARB THERAPY RXD/TAKEN: CPT | Performed by: INTERNAL MEDICINE

## 2025-04-08 PROCEDURE — G2211 COMPLEX E/M VISIT ADD ON: HCPCS | Performed by: INTERNAL MEDICINE

## 2025-04-08 PROCEDURE — 3078F DIAST BP <80 MM HG: CPT | Performed by: INTERNAL MEDICINE

## 2025-04-08 PROCEDURE — 3008F BODY MASS INDEX DOCD: CPT | Performed by: INTERNAL MEDICINE

## 2025-04-08 PROCEDURE — 1159F MED LIST DOCD IN RCRD: CPT | Performed by: INTERNAL MEDICINE

## 2025-04-08 PROCEDURE — 3074F SYST BP LT 130 MM HG: CPT | Performed by: INTERNAL MEDICINE

## 2025-04-08 PROCEDURE — 99214 OFFICE O/P EST MOD 30 MIN: CPT | Performed by: INTERNAL MEDICINE

## 2025-04-08 NOTE — PROGRESS NOTES
Subjective       Shakira Lloyd is a 75 y.o. female who has past medical history of hypertension, diabetes was coming to see me today for CKD follow-up     last office visit March 2024.  Shakira came alone today.  No kidney home plaints or concerns.  Most recent blood work showed stable serum creatinine 1.1 GFR 47.  Blood pressure is not controlled.  No significant anemia.  No albuminuria.  No significant NSAID use at home.  No concerns at this time stable from kidney standpoint    Prior notes    Last office visit July 2023.  Shakira came today with her son Matheus.  She is doing great.  Kidney function is stable with GFR around 50.  Blood pressure is excellent.  Vitamin D is now normal.  Uric acid is normal.  No anemia.  She continues to be on magnesium supplements and vitamin D.  No kidney health complaints or concerns    Prior notes     Last office visit January 2023. We started Uloric for hyperuricemia.,  Continued Farxiga and conservative measures for CKD. Shakira came today with her son Matheus. She continues to check blood pressure at home and average reading 120/60. She had recent blood work that showed stable serum creatinine and GFR within normal active lites. Uric acid is now normal. No albumin leak in the urine. Vitamin D is mildly low at 27-start vitamin D supplements. Overall she is stable. No congestive complaints or concerns today     Her prior notes     Last office visit July 2022. Shakira came today with her son, Matheus. She reports marked improvement in her fatigue since treating her anemia with IV iron. No lower urine tract symptoms. No significant leg swelling. PE was WNL today. She continues to follow low-salt diet. She does not check blood pressure at home but was educated to do so. No UTI. She has been on Farxiga 5 mg with no issues. She did blood work a week ago and all results were discussed with her in detail today including stable serum creatinine 1.2, GFR 49 and within normal electrolytes. Elevated  "uric acid. No albuminuria. Within normal vitamin PTH. Overall she is doing really well and her kidneys are stable     Per prior notes     Last office visit January 2022. We started SGL 2 inhibitor for GFR preservation and better diabetes control. We started IV iron for iron deficiency anemia. Shakira came today with her son \"Matheus\". She reports marked improvement in her fatigue. No lower urine tract symptoms. No significant leg swelling. She continues to follow low-salt diet. She does not check blood pressure at home. No UTI. She has been on Farxiga 5 mg with no issues. She did blood work a week ago and all results were discussed with her in detail today including improved serum creatinine 1, GFR 60 and within normal electrolytes. Improved iron storage and anemia. No albuminuria. Within normal vitamin D and PTH. Overall she is doing really well     Her prior notes     Shakira came today with her son. She reports being in a good state of health. She denies pain or burning with urination. She denies NSAID use. Only gabapentin or Tylenol for pain. She follows low-salt diet.  Shakira had recent hospital admission in November 2021 to [facility] for UTI/sepsis/hypoglycemia and she developed MAROGT and above CKD and serum creatinine peaked 12.3 from baseline 1.2, . MARGOT was managed with IV fluids and antibiotics. Her blood sugar medication was adjusted and metformin was decreased to 500 mg twice daily. She did not need dialysis. Repeat blood work showed improved serum creatinine 1.2     Social history: , social drinke, used to work as a   Family history: Diabetes, heart problems  Surgical history: Uterine ablation       Objective   /78 (BP Location: Left arm, Patient Position: Standing, BP Cuff Size: Adult)   Pulse 76   Temp 36.3 °C (97.3 °F)   Ht 1.651 m (5' 5\")   Wt 86.3 kg (190 lb 3.2 oz)   SpO2 97%   BMI 31.65 kg/m²   Wt Readings from Last 3 Encounters:   04/08/25 86.3 kg (190 lb 3.2 " oz)   03/26/25 87.7 kg (193 lb 6.4 oz)   09/25/24 85.5 kg (188 lb 9.6 oz)       Physical Exam    General appearance: no distress awake and alert on room air, euvolemic on exam  Eyes: non-icteric  HEENT: atrumatic head, PEERLA, moist mucosa  Skin: no apparent rash  Heart: NSR, S1, S2 normal, no murmur or gallop  Lungs: Symmetrical expansion,CTA bilat no wheezing/crackles  Abdomen: soft, nt/nd, obese  Extremities: no edema bilat  Neuro: No FND,asterixis, no focal deficits noticed        Review of Systems     Constitutional: no fever, no chills, no recent weight gain and no recent weight loss.   Eyes: no blurred vision and no diplopia.   ENT: no hearing loss, no earache, no sore throat, no swollen glands in the neck and no nasal discharge.   Cardiovascular: no chest pain, no palpitations and no lower extremity edema.   Respiratory: no shortness of breath, no chronic cough and no shortness of breath during exertion.   Gastrointestinal: no abdominal pain, no constipation, no heartburn, no vomiting, no bloody stools and no change in bowel movements.   Genitourinary: no dysuria and no hematuria.   Musculoskeletal: no arthralgias and no myalgias.   Skin: no rashes and no skin lesions.   Neurological: no headaches and no dizziness.   Psychiatric: no confusion, no depression and no anxiety.   Endocrine: no heat intolerance, no cold intolerance, appetite not increased, no thyroid disorder, no increased urinary frequency and no dry skin.   Hematologic/Lymphatic: does not bleed easily and does not bruise easily.   All other systems have been reviewed and are negative for complaint.         Data Review        Results from last 7 days   Lab Units 04/02/25  1120   [facility] WBC AUTO Thousand/uL 7.4   [facility] HEMOGLOBIN g/dL 12.9   [facility] HEMATOCRIT % 39.6   [facility] PLATELETS AUTO Thousand/uL 255            Lab Results   Component Value Date    URICACID 5.5 02/26/2024           Lab Results   Component Value Date    HGBA1C 9.6 (H) 03/26/2025            Results from last 7 days   Lab Units 04/02/25  1120   [facility] SODIUM mmol/L 138   [facility] POTASSIUM mmol/L 4.9   [facility] CHLORIDE mmol/L 104   [facility] CO2 mmol/L 25   [facility] BUN mg/dL 19   [facility] GLUCOSE mg/dL 172*   [facility] CALCIUM mg/dL 9.8   [facility] EGFR mL/min/1.73m2 47*           ALBUMIN/CREATININE RATIO, RANDOM URINE   Date Value Ref Range Status   04/02/2025 26 <30 mg/g creat Final     Comment:        The ADA defines abnormalities in albumin  excretion as follows:     Albuminuria Category        Result (mg/g creatinine)     Normal to Mildly increased   <30  Moderately increased            Severely increased           > OR = 300     The ADA recommends that at least two of three  specimens collected within a 3-6 month period be  abnormal before considering a patient to be  within a diagnostic category.     03/26/2025 11 <30 mg/g creat Final     Comment:        The ADA defines abnormalities in albumin  excretion as follows:     Albuminuria Category        Result (mg/g creatinine)     Normal to Mildly increased   <30  Moderately increased            Severely increased           > OR = 300     The ADA recommends that at least two of three  specimens collected within a 3-6 month period be  abnormal before considering a patient to be  within a diagnostic category.       Albumin/Creatinine Ratio   Date Value Ref Range Status   02/26/2024   Final     Comment:     One or more analytes used in this calculation is outside of the analytical measurement range. Calculation cannot be performed.            RFP  Recent Labs     04/02/25  1120 03/26/25  0845 02/26/24  0853 09/14/23  0951 07/26/23  1139 01/16/23  1051 07/13/22  1000 01/11/22  0920 11/28/21  0631 11/27/21  0622 11/26/21  0656    137 138 137 137 137 137 136   < > 134* 135*   K 4.9 4.8 4.8 4.7 4.1 4.2 4.4 4.0   < > 3.8 3.1*    103 104 103 105 102 104 100   < > 103 101   CO2 25 23 24 23 24 25 24 24   < > 21 21   BUN 19 27* 34* 21 17 26* 21 18   <  > 26* 40*   CREATININE 1.21* 1.36* 1.20* 1.15* 1.25* 1.19* 1.02 1.06*   < > 1.48* 2.17*   GLUCOSE 172* 204* 129* 124* 140* 162* 143* 181*   < > 141* 133*   CALCIUM 9.8 9.7 10.4* 9.6 9.9 10.1 9.8 10.2   < > 7.0* 7.1*   PHOS 3.6  --  3.7  --  3.5 3.7 3.3  --   --  2.4* 2.4*   EGFR 47* 41* 48*  --   --   --   --   --   --   --   --    ANIONGAP  --  11 15 16 12 14 13 16   < > 14 16    < > = values in this interval not displayed.        Urineanalysis  Recent Labs     01/16/23  1051 07/13/22  1000 01/13/22  1413 11/23/21  1213 11/23/21  0154   COLORU YELLOW STRAW YELLOW YELLOW YELLOW   APPEARANCEU CLEAR CLEAR HAZY CLEAR HAZY   SPECGRAVU 1.013 1.015 1.029 1.011 1.012   VANDANA 5.0 5.0 5.0 5.0 5.0   PROTUR NEGATIVE 13  NEGATIVE 61*  30(1+)* NEGATIVE 30(1+)*   GLUCOSEU >=500(3+)* 150(2+)* NEGATIVE NEGATIVE NEGATIVE   BLOODU NEGATIVE NEGATIVE NEGATIVE SMALL(1+)* MODERATE(2+)*   KETONESU NEGATIVE NEGATIVE NEGATIVE NEGATIVE NEGATIVE   BILIRUBINU NEGATIVE NEGATIVE NEGATIVE NEGATIVE NEGATIVE   NITRITEU NEGATIVE NEGATIVE NEGATIVE NEGATIVE NEGATIVE   LEUKOCYTESU NEGATIVE NEGATIVE SMALL(1+)* TRACE* SMALL(1+)*       Urine Electrolytes  Recent Labs     04/02/25  1120 03/26/25  0845 02/26/24  0853 07/26/23  1139 01/16/23  1051 07/13/22  1000 01/13/22  1413 11/23/21  1213 11/23/21  0154 06/10/21  0857   CREATU 99 131 73.3 126.0 78.6 50.0  50.0 273.0  273.0  --   --  225.0   PROTUR  --   --   --   --  NEGATIVE 13  NEGATIVE 61*  30(1+)* NEGATIVE 30(1+)*  --    UTPCR  --   --   --   --   --  0.26* 0.22*  --   --   --    ALBUMINUR 2.6 1.5 <7.0  --   --   --   --   --   --   --    MICROALBCREA 26 11  --  11.0 SEE COMMENT 17.2 68.9*  --   --  33.3*        Urine Micro  Recent Labs     01/13/22  1413 11/23/21  1213 11/23/21  0154   WBCU 12* 18* 24*   RBCU 53* 1* 3*   HYALCASTU OCC*  --   --    SQUAMEPIU 26 1 1   BACTERIAU  --  3+* 3+*   MUCUSU FEW  --  FEW        Iron  Recent Labs     04/02/25  1120 02/26/24  0853 01/16/23  1051  07/13/22  1000 05/11/22  1138 04/08/22  1224 03/04/22  1310 01/13/22  1413 06/10/21  0857 11/25/19  0835 05/18/18  1200   IRON 68 89 111 55 87 34* 57 43 29* 52 52   TIBC 314 304 280 249 247 239* 267 280 313 330 288   IRONSAT 22 29 40 22* 35 14* 21* 15* 9* 16* 18*   FERRITIN 660* 727* 943* 1,087* 1,288* 1,144* 564* 102  --   --  93          Current Outpatient Medications on File Prior to Visit   Medication Sig Dispense Refill    acetaminophen (Tylenol) 325 mg tablet Take by mouth every 6 hours.      cholecalciferol (Vitamin D3) 50 MCG (2000 UT) tablet Take 1 tablet (50 mcg) by mouth once daily.      dapagliflozin propanediol (Farxiga) 5 mg Take 1 tablet (5 mg) by mouth once daily in the morning. Take before meals. 90 tablet 3    febuxostat (Uloric) 40 mg tablet TAKE 1 TABLET BY MOUTH EVERY DAY 30 tablet 0    ferrous sulfate 325 (65 Fe) MG EC tablet TAKE ONE TABLET BY MOUTH EVERY DAY 90 tablet 1    folic acid (Folvite) 1 mg tablet Take 1 tablet (1 mg) by mouth once daily. 90 tablet 1    gabapentin (Neurontin) 100 mg capsule TAKE ONE CAPSULE BY MOUTH THREE TIMES A DAY (Patient taking differently: Take 1 capsule (100 mg) by mouth if needed (flare).) 90 capsule 0    losartan (Cozaar) 50 mg tablet Take 1 tablet (50 mg) by mouth once daily. 90 tablet 3    magnesium oxide (Mag-Ox) 400 mg (241.3 mg magnesium) tablet TAKE ONE TABLET BY MOUTH DAILY 90 tablet 0    metFORMIN (Glucophage) 500 mg tablet TAKE ONE TABLET BY MOUTH TWICE A DAY WITH FOOD 180 tablet 1    metoprolol succinate XL (Toprol-XL) 50 mg 24 hr tablet TAKE ONE TABLET BY MOUTH EVERY DAY 90 tablet 0    OneTouch Verio test strips strip 1 strip once daily. 90 strip 0    rosuvastatin (Crestor) 10 mg tablet Take 1 tablet (10 mg) by mouth once daily. 100 tablet 3     No current facility-administered medications on file prior to visit.           Assessment and Plan       Shakira Lloyd  is a 75 y.o. female who has past medical history of  hypertension, diabetes was  coming to see me today for CKD follow-up     Impression  # Chronic kidney disease -G3 A/A1- , stable, most likely diabetic nephropathy and atherosclerotic cardiovascular disease  -Baseline serum creatinine 1-1.2, GFR 50-60 mils per minute per 1.73 mÂ². She had MARGOT on top of CKD in November 2021 secondary to hemodynamic injury and sepsis serum creatinine was above 12 with GFR less than 15-no dialysis needed.   - Serum Cr today was 1.2 and GFR 49 mils per minute per 1.73 mÂ²  -Within normal electrolytes including sodium, potassium and no significant acidosis.  -CT scan without contrast done in November 2021 for MARGOT evaluation showed normal kidneys bilateral with no obstruction  -Continue losartan 25 mg PO qd and SGL 2 inhibitors     # Elevated Uric Acid-abnormal  - Currently at 4-5  -Continue febuxostat 40 mg PO qd     Today we discussed extensively conservative measures, diabetes and hypertension control, RAAS and SGL 2 inhibitor use        # BP - today at office is accepted with no orthostatic hypotension. Home blood pressure runs 120/60  -She follows low-salt diet. Current medication metoprolol 50 mg, Farxiga 5 mg-, [id number] continue same.   -Instructed to continue to check blood pressure at home     #Anemia Hb 9.7 improved 12.8.   - She is asymptomatic. Within normal iron studies. She is status post 1 g IV iron  - No indication to continue IV iron at this time d/t controlled anemia. Will monitor     #BMD   - with normal calcium. Within normal PTH, phosphorus. Vitamin D is normal.  Will hydrate and monitor     # CVS  -On statins     #Diabetes-on metformin 500 mg twice daily reduced from prior and Farxiga 5 mg.   - Denies low blood sugar readings at home.   - Counseled regarding UTI risk     # Hypomagnesemia on supplement-we will monitor magnesium level      #Others  - No NSAIDs, no contrast as possible. If to be done- we recommend holding ACEi/ARBS/diuretics 24 hrs prior to contrast exposure and ensure  appropriate hydration   - Ensure well hydration with at least 40-45 ounces a day of liquid  - Limit salt in diet to less than 2 grams per day  - No smoking     Follow-up in 12 months with repeat CKD lab and urine analysis        Alyssia Saleh MD, MS, MARGIE CHAPPELL  Clinical  - [facility] [employer]  Nephrologist - [facility] - [facility]

## 2025-04-08 NOTE — PATIENT INSTRUCTIONS
Dear KAT   It was nice seeing you in the nephrology clinic today     Today we discussed the following:     #Chronic kidney disease stage III. Your kidney function is stable around 45-50 percent-good job     #Diabetes-continue Farxiga 5 mg for better diabetes control and kidney protection     #Hypertension-your blood pressure is in target per home check. Good job. Limit your salt intake as possible and continue same medications.      #Anemia-improved. No need for IV iron at this time     #Elevated uric acid- uric acid is normal-continue with febuxostat     #Vitamin D is normal-continue vitamin D    # Hypomagnesemia on magnesium supplement-we will check magnesium next office visit        Follow-up in 12 months with repeat blood work and urine analysis prior to visit     Please call our office if you have any question  Thank you for coming to see me today     Alyssia Saleh MD, MS, MARGIE CHAPPELL  Clinical  - [facility] [employer]  Nephrologist - [facility] - [facility]

## 2025-04-23 DIAGNOSIS — M10.00 IDIOPATHIC GOUT, UNSPECIFIED CHRONICITY, UNSPECIFIED SITE: ICD-10-CM

## 2025-04-26 RX ORDER — FEBUXOSTAT 40 MG/1
40 TABLET, FILM COATED ORAL DAILY
Qty: 30 TABLET | Refills: 0 | Status: SHIPPED | OUTPATIENT
Start: 2025-04-26

## 2025-04-28 DIAGNOSIS — M10.00 IDIOPATHIC GOUT, UNSPECIFIED CHRONICITY, UNSPECIFIED SITE: ICD-10-CM

## 2025-04-29 RX ORDER — FEBUXOSTAT 40 MG/1
40 TABLET, FILM COATED ORAL DAILY
Qty: 30 TABLET | Refills: 0 | Status: SHIPPED | OUTPATIENT
Start: 2025-04-29

## 2025-05-08 DIAGNOSIS — E78.00 HYPERCHOLESTEREMIA: ICD-10-CM

## 2025-05-10 RX ORDER — ROSUVASTATIN CALCIUM 10 MG/1
10 TABLET, COATED ORAL DAILY
Qty: 90 TABLET | Refills: 0 | Status: SHIPPED | OUTPATIENT
Start: 2025-05-10

## 2025-05-17 DIAGNOSIS — D52.9 ANEMIA DUE TO FOLIC ACID DEFICIENCY, UNSPECIFIED DEFICIENCY TYPE: ICD-10-CM

## 2025-05-19 RX ORDER — FOLIC ACID 1 MG/1
1 TABLET ORAL DAILY
Qty: 90 TABLET | Refills: 0 | Status: SHIPPED | OUTPATIENT
Start: 2025-05-19

## 2025-05-31 DIAGNOSIS — M10.00 IDIOPATHIC GOUT, UNSPECIFIED CHRONICITY, UNSPECIFIED SITE: ICD-10-CM

## 2025-06-02 RX ORDER — FEBUXOSTAT 40 MG/1
40 TABLET, FILM COATED ORAL DAILY
Qty: 30 TABLET | Refills: 0 | Status: SHIPPED | OUTPATIENT
Start: 2025-06-02

## 2025-06-21 LAB
25(OH)D3+25(OH)D2 SERPL-MCNC: 49 NG/ML (ref 30–100)
ALBUMIN SERPL-MCNC: 4.3 G/DL (ref 3.6–5.1)
ALP SERPL-CCNC: 43 U/L (ref 37–153)
ALT SERPL-CCNC: 17 U/L (ref 6–29)
ANION GAP SERPL CALCULATED.4IONS-SCNC: 9 MMOL/L (CALC) (ref 7–17)
AST SERPL-CCNC: 19 U/L (ref 10–35)
BASOPHILS # BLD AUTO: 30 CELLS/UL (ref 0–200)
BASOPHILS NFR BLD AUTO: 0.4 %
BILIRUB SERPL-MCNC: 0.6 MG/DL (ref 0.2–1.2)
BUN SERPL-MCNC: 27 MG/DL (ref 7–25)
CALCIUM SERPL-MCNC: 10.1 MG/DL (ref 8.6–10.4)
CHLORIDE SERPL-SCNC: 104 MMOL/L (ref 98–110)
CHOLEST SERPL-MCNC: 117 MG/DL
CHOLEST/HDLC SERPL: 3.1 (CALC)
CO2 SERPL-SCNC: 24 MMOL/L (ref 20–32)
CREAT SERPL-MCNC: 1.26 MG/DL (ref 0.6–1)
EGFRCR SERPLBLD CKD-EPI 2021: 45 ML/MIN/1.73M2
EOSINOPHIL # BLD AUTO: 237 CELLS/UL (ref 15–500)
EOSINOPHIL NFR BLD AUTO: 3.2 %
ERYTHROCYTE [DISTWIDTH] IN BLOOD BY AUTOMATED COUNT: 14.1 % (ref 11–15)
GLUCOSE SERPL-MCNC: 200 MG/DL (ref 65–99)
HCT VFR BLD AUTO: 39.9 % (ref 35–45)
HDLC SERPL-MCNC: 38 MG/DL
HGB BLD-MCNC: 12.5 G/DL (ref 11.7–15.5)
LDLC SERPL CALC-MCNC: 49 MG/DL (CALC)
LYMPHOCYTES # BLD AUTO: 2694 CELLS/UL (ref 850–3900)
LYMPHOCYTES NFR BLD AUTO: 36.4 %
MCH RBC QN AUTO: 29.3 PG (ref 27–33)
MCHC RBC AUTO-ENTMCNC: 31.3 G/DL (ref 32–36)
MCV RBC AUTO: 93.7 FL (ref 80–100)
MONOCYTES # BLD AUTO: 629 CELLS/UL (ref 200–950)
MONOCYTES NFR BLD AUTO: 8.5 %
NEUTROPHILS # BLD AUTO: 3811 CELLS/UL (ref 1500–7800)
NEUTROPHILS NFR BLD AUTO: 51.5 %
NONHDLC SERPL-MCNC: 79 MG/DL (CALC)
PLATELET # BLD AUTO: 228 THOUSAND/UL (ref 140–400)
PMV BLD REES-ECKER: 9.4 FL (ref 7.5–12.5)
POTASSIUM SERPL-SCNC: 4.5 MMOL/L (ref 3.5–5.3)
PROT SERPL-MCNC: 7.3 G/DL (ref 6.1–8.1)
RBC # BLD AUTO: 4.26 MILLION/UL (ref 3.8–5.1)
SODIUM SERPL-SCNC: 137 MMOL/L (ref 135–146)
TRIGL SERPL-MCNC: 244 MG/DL
TSH SERPL-ACNC: 1.75 MIU/L (ref 0.4–4.5)
VIT B12 SERPL-MCNC: 816 PG/ML (ref 200–1100)
WBC # BLD AUTO: 7.4 THOUSAND/UL (ref 3.8–10.8)

## 2025-06-26 ENCOUNTER — APPOINTMENT (OUTPATIENT)
Dept: PRIMARY CARE | Facility: CLINIC | Age: 75
End: 2025-06-26
Payer: MEDICARE

## 2025-06-26 VITALS
DIASTOLIC BLOOD PRESSURE: 80 MMHG | BODY MASS INDEX: 31.85 KG/M2 | WEIGHT: 191.4 LBS | OXYGEN SATURATION: 98 % | HEART RATE: 68 BPM | SYSTOLIC BLOOD PRESSURE: 125 MMHG | TEMPERATURE: 97.1 F

## 2025-06-26 DIAGNOSIS — N18.32 STAGE 3B CHRONIC KIDNEY DISEASE (MULTI): ICD-10-CM

## 2025-06-26 DIAGNOSIS — G62.9 NEUROPATHY: ICD-10-CM

## 2025-06-26 DIAGNOSIS — E83.42 HYPOMAGNESEMIA: ICD-10-CM

## 2025-06-26 DIAGNOSIS — R73.09 ABNORMAL BLOOD SUGAR: ICD-10-CM

## 2025-06-26 DIAGNOSIS — E11.649 TYPE 2 DIABETES MELLITUS WITH HYPOGLYCEMIA WITHOUT COMA, WITHOUT LONG-TERM CURRENT USE OF INSULIN: Primary | ICD-10-CM

## 2025-06-26 DIAGNOSIS — E78.00 PURE HYPERCHOLESTEROLEMIA: ICD-10-CM

## 2025-06-26 DIAGNOSIS — I10 HYPERTENSION, UNSPECIFIED TYPE: ICD-10-CM

## 2025-06-26 DIAGNOSIS — D50.9 IRON DEFICIENCY ANEMIA, UNSPECIFIED IRON DEFICIENCY ANEMIA TYPE: ICD-10-CM

## 2025-06-26 PROCEDURE — 1160F RVW MEDS BY RX/DR IN RCRD: CPT | Performed by: INTERNAL MEDICINE

## 2025-06-26 PROCEDURE — 1159F MED LIST DOCD IN RCRD: CPT | Performed by: INTERNAL MEDICINE

## 2025-06-26 PROCEDURE — 99214 OFFICE O/P EST MOD 30 MIN: CPT | Performed by: INTERNAL MEDICINE

## 2025-06-26 PROCEDURE — 3079F DIAST BP 80-89 MM HG: CPT | Performed by: INTERNAL MEDICINE

## 2025-06-26 PROCEDURE — G2211 COMPLEX E/M VISIT ADD ON: HCPCS | Performed by: INTERNAL MEDICINE

## 2025-06-26 PROCEDURE — 3074F SYST BP LT 130 MM HG: CPT | Performed by: INTERNAL MEDICINE

## 2025-06-26 PROCEDURE — 1036F TOBACCO NON-USER: CPT | Performed by: INTERNAL MEDICINE

## 2025-06-26 PROCEDURE — 4010F ACE/ARB THERAPY RXD/TAKEN: CPT | Performed by: INTERNAL MEDICINE

## 2025-06-26 RX ORDER — FERROUS SULFATE 325(65) MG
1 TABLET, DELAYED RELEASE (ENTERIC COATED) ORAL DAILY
Qty: 90 TABLET | Refills: 1 | Status: SHIPPED | OUTPATIENT
Start: 2025-06-26

## 2025-06-26 RX ORDER — GLIPIZIDE 2.5 MG/1
2.5 TABLET, EXTENDED RELEASE ORAL DAILY
Qty: 30 TABLET | Refills: 11 | Status: SHIPPED | OUTPATIENT
Start: 2025-06-26 | End: 2026-06-26

## 2025-06-26 RX ORDER — METOPROLOL SUCCINATE 50 MG/1
50 TABLET, EXTENDED RELEASE ORAL DAILY
Qty: 90 TABLET | Refills: 0 | Status: SHIPPED | OUTPATIENT
Start: 2025-06-26

## 2025-06-26 RX ORDER — METFORMIN HYDROCHLORIDE 500 MG/1
TABLET ORAL
Qty: 180 TABLET | Refills: 1 | Status: SHIPPED | OUTPATIENT
Start: 2025-06-26

## 2025-06-26 RX ORDER — LANOLIN ALCOHOL/MO/W.PET/CERES
1 CREAM (GRAM) TOPICAL DAILY
Qty: 90 TABLET | Refills: 0 | Status: SHIPPED | OUTPATIENT
Start: 2025-06-26

## 2025-06-26 RX ORDER — GABAPENTIN 100 MG/1
100 CAPSULE ORAL AS NEEDED
COMMUNITY
Start: 2025-06-26

## 2025-06-26 ASSESSMENT — ENCOUNTER SYMPTOMS
HYPERTENSION: 1
ABDOMINAL PAIN: 0
WHEEZING: 0
DIFFICULTY URINATING: 0
FATIGUE: 0
FEVER: 0
HEADACHES: 0
SINUS PAIN: 0
COUGH: 0
DIZZINESS: 0
BRUISES/BLEEDS EASILY: 0
ARTHRALGIAS: 0
DIARRHEA: 0
PALPITATIONS: 0
SORE THROAT: 0
BLOOD IN STOOL: 0
UNEXPECTED WEIGHT CHANGE: 0

## 2025-06-26 NOTE — PROGRESS NOTES
Subjective   Patient ID: Shakira Lloyd is a 75 y.o. female who presents for Diabetes, Hyperlipidemia, Hypertension, and Results (BW).    - Recent blood work and the plan of care reviewed  - Uncontrolled diabetes hemoglobin A1c 9.6  Patient comes about diet control continues metformin continues Farxiga  Add glipizide 2.5 mg in a.m. follow-up results closely repeat hemoglobin A1c albumin creatinine ratio in 3 months  -Hypertension improved continue losartan 50 mg daily  -- Hyperlipidemia continue with Crestor 10 mg follow-up lipid profile  ---Hyperuricemia on Uloric doing well from nephrology  -Renal insufficiency improving continue on Farxiga follow-up nephrology continue low-salt diet.  Follow-up results  -Anemia controlled improved continues iron supplement follow-up CBC continue with iron supplement.  - Peripheral neuropathy follow-up magnesium level vitamin D vitamin B12 continues multiple vitamins  Continue gabapentin  - Arthritis continues Tylenol as needed patient given disability placard for 5 years  -History of hypothyroid toxic multinodular goiter treated by endocrinology patient off any treatment methimazole for almost a year she needs to follow-up thyroid function test order placed today follow-up results closely  Follow-up 3 months Medicare physical      Diabetes  Pertinent negatives for hypoglycemia include no dizziness or headaches. Pertinent negatives for diabetes include no chest pain and no fatigue.   Hyperlipidemia  Pertinent negatives include no chest pain.   Hypertension  Pertinent negatives include no chest pain, headaches or palpitations.          Review of Systems   Constitutional:  Negative for fatigue, fever and unexpected weight change.   HENT:  Negative for congestion, ear discharge, ear pain, mouth sores, sinus pain and sore throat.    Eyes:  Negative for visual disturbance.   Respiratory:  Negative for cough and wheezing.    Cardiovascular:  Negative for chest pain, palpitations and  leg swelling.   Gastrointestinal:  Negative for abdominal pain, blood in stool and diarrhea.   Genitourinary:  Negative for difficulty urinating.   Musculoskeletal:  Negative for arthralgias.   Skin:  Negative for rash.   Neurological:  Negative for dizziness and headaches.   Hematological:  Does not bruise/bleed easily.   Psychiatric/Behavioral:  Negative for behavioral problems.    All other systems reviewed and are negative.      Objective   Lab Results   Component Value Date    HGBA1C 9.6 (H) 03/26/2025      /80   Pulse 68   Temp 36.2 °C (97.1 °F)   Wt 86.8 kg (191 lb 6.4 oz)   SpO2 98%   BMI 31.85 kg/m²   Lab Results   Component Value Date    WBC 7.4 06/20/2025    HGB 12.5 06/20/2025    HCT 39.9 06/20/2025     06/20/2025    CHOL 117 06/20/2025    TRIG 244 (H) 06/20/2025    HDL 38 (L) 06/20/2025    ALT 17 06/20/2025    AST 19 06/20/2025     06/20/2025    K 4.5 06/20/2025     06/20/2025    CREATININE 1.26 (H) 06/20/2025    BUN 27 (H) 06/20/2025    CO2 24 06/20/2025    TSH 1.75 06/20/2025    INR 1.1 11/22/2021    HGBA1C 9.6 (H) 03/26/2025     par   Physical Exam  Vitals and nursing note reviewed.   Constitutional:       Appearance: Normal appearance.   HENT:      Head: Normocephalic.      Nose: Nose normal.   Eyes:      Conjunctiva/sclera: Conjunctivae normal.      Pupils: Pupils are equal, round, and reactive to light.   Cardiovascular:      Rate and Rhythm: Regular rhythm.   Pulmonary:      Effort: Pulmonary effort is normal.      Breath sounds: Normal breath sounds.   Abdominal:      General: Abdomen is flat.      Palpations: Abdomen is soft.   Musculoskeletal:      Cervical back: Neck supple.   Skin:     General: Skin is warm.   Neurological:      General: No focal deficit present.      Mental Status: She is oriented to person, place, and time.   Psychiatric:         Mood and Affect: Mood normal.         Assessment/Plan   Shakira was seen today for diabetes, hyperlipidemia,  hypertension and results.  Diagnoses and all orders for this visit:  Type 2 diabetes mellitus with hypoglycemia without coma, without long-term current use of insulin (Primary)  -     metFORMIN (Glucophage) 500 mg tablet; TAKE ONE TABLET BY MOUTH TWICE A DAY WITH FOOD  -     Albumin-Creatinine Ratio, Urine Random; Future  -     Hemoglobin A1C; Future  -     glipiZIDE XL (Glucotrol XL) 2.5 mg 24 hr tablet; Take 1 tablet (2.5 mg) by mouth once daily. Do not crush, chew, or split.  -     Albumin-Creatinine Ratio, Urine Random  -     Hemoglobin A1C  Iron deficiency anemia, unspecified iron deficiency anemia type  -     ferrous sulfate 325 (65 Fe) mg EC tablet; Take 1 tablet by mouth once daily.  Hypomagnesemia  -     magnesium oxide (Mag-Ox) 400 mg (241.3 mg elemental) tablet; Take 1 tablet by mouth once daily.  Hypertension, unspecified type  -     metoprolol succinate XL (Toprol-XL) 50 mg 24 hr tablet; Take 1 tablet (50 mg) by mouth once daily.  Neuropathy  Abnormal blood sugar  -     Hemoglobin A1C; Future  -     Hemoglobin A1C  Stage 3b chronic kidney disease (Multi)  Pure hypercholesterolemia  Other orders  -     Follow Up In Primary Care - Established  -     Follow Up In Primary Care - Medicare Annual; Future   - Recent blood work and the plan of care reviewed  - Uncontrolled diabetes hemoglobin A1c 9.6  Patient comes about diet control continues metformin continues Farxiga  Add glipizide 2.5 mg in a.m. follow-up results closely repeat hemoglobin A1c albumin creatinine ratio in 3 months  -Hypertension improved continue losartan 50 mg daily  -- Hyperlipidemia continue with Crestor 10 mg follow-up lipid profile  ---Hyperuricemia on Uloric doing well from nephrology  -Renal insufficiency improving continue on Farxiga follow-up nephrology continue low-salt diet.  Follow-up results  -Anemia controlled improved continues iron supplement follow-up CBC continue with iron supplement.  - Peripheral neuropathy follow-up  magnesium level vitamin D vitamin B12 continues multiple vitamins  Continue gabapentin  - Arthritis continues Tylenol as needed patient given disability placard for 5 years  -History of hypothyroid toxic multinodular goiter treated by endocrinology patient off any treatment methimazole for almost a year she needs to follow-up thyroid function test order placed today follow-up results closely  Follow-up 3 months Medicare physical

## 2025-06-28 DIAGNOSIS — M10.00 IDIOPATHIC GOUT, UNSPECIFIED CHRONICITY, UNSPECIFIED SITE: ICD-10-CM

## 2025-07-07 RX ORDER — FEBUXOSTAT 40 MG/1
40 TABLET, FILM COATED ORAL DAILY
Qty: 90 TABLET | Refills: 3 | OUTPATIENT
Start: 2025-07-07 | End: 2026-07-07

## 2025-07-12 DIAGNOSIS — E83.42 HYPOMAGNESEMIA: ICD-10-CM

## 2025-07-14 RX ORDER — LANOLIN ALCOHOL/MO/W.PET/CERES
1 CREAM (GRAM) TOPICAL DAILY
Qty: 90 TABLET | Refills: 0 | Status: SHIPPED | OUTPATIENT
Start: 2025-07-14

## 2025-08-10 DIAGNOSIS — D52.9 ANEMIA DUE TO FOLIC ACID DEFICIENCY, UNSPECIFIED DEFICIENCY TYPE: ICD-10-CM

## 2025-08-10 DIAGNOSIS — E78.00 HYPERCHOLESTEREMIA: ICD-10-CM

## 2025-08-11 RX ORDER — FOLIC ACID 1 MG/1
1 TABLET ORAL DAILY
Qty: 90 TABLET | Refills: 0 | Status: SHIPPED | OUTPATIENT
Start: 2025-08-11

## 2025-08-11 RX ORDER — ROSUVASTATIN CALCIUM 10 MG/1
10 TABLET, COATED ORAL DAILY
Qty: 90 TABLET | Refills: 0 | Status: SHIPPED | OUTPATIENT
Start: 2025-08-11

## 2025-09-30 ENCOUNTER — APPOINTMENT (OUTPATIENT)
Dept: PRIMARY CARE | Facility: CLINIC | Age: 75
End: 2025-09-30
Payer: MEDICARE

## 2026-04-15 ENCOUNTER — APPOINTMENT (OUTPATIENT)
Dept: NEPHROLOGY | Facility: CLINIC | Age: 76
End: 2026-04-15
Payer: MEDICARE